# Patient Record
Sex: FEMALE | Race: WHITE | NOT HISPANIC OR LATINO | Employment: OTHER | URBAN - METROPOLITAN AREA
[De-identification: names, ages, dates, MRNs, and addresses within clinical notes are randomized per-mention and may not be internally consistent; named-entity substitution may affect disease eponyms.]

---

## 2017-03-02 ENCOUNTER — TELEPHONE (OUTPATIENT)
Dept: OBSTETRICS AND GYNECOLOGY | Age: 72
End: 2017-03-02

## 2017-03-02 NOTE — TELEPHONE ENCOUNTER
Had pap and hpv done, medicare said that it needed to be resubmitted with a procedure code so they will pay

## 2017-05-09 ENCOUNTER — PROCEDURE VISIT (OUTPATIENT)
Dept: OBSTETRICS AND GYNECOLOGY | Age: 72
End: 2017-05-09

## 2017-05-09 ENCOUNTER — OFFICE VISIT (OUTPATIENT)
Dept: OBSTETRICS AND GYNECOLOGY | Age: 72
End: 2017-05-09

## 2017-05-09 VITALS
BODY MASS INDEX: 20.98 KG/M2 | HEIGHT: 62 IN | WEIGHT: 114 LBS | DIASTOLIC BLOOD PRESSURE: 80 MMHG | SYSTOLIC BLOOD PRESSURE: 130 MMHG

## 2017-05-09 DIAGNOSIS — D25.9 UTERINE LEIOMYOMA, UNSPECIFIED LOCATION: Primary | ICD-10-CM

## 2017-05-09 DIAGNOSIS — L90.0 LICHEN SCLEROSUS ET ATROPHICUS: ICD-10-CM

## 2017-05-09 DIAGNOSIS — D25.2 SUBSEROUS LEIOMYOMA OF UTERUS: Primary | ICD-10-CM

## 2017-05-09 PROCEDURE — 99214 OFFICE O/P EST MOD 30 MIN: CPT | Performed by: OBSTETRICS & GYNECOLOGY

## 2017-05-09 PROCEDURE — 76830 TRANSVAGINAL US NON-OB: CPT | Performed by: OBSTETRICS & GYNECOLOGY

## 2017-11-02 ENCOUNTER — OFFICE VISIT (OUTPATIENT)
Dept: GASTROENTEROLOGY | Facility: CLINIC | Age: 72
End: 2017-11-02

## 2017-11-02 VITALS
DIASTOLIC BLOOD PRESSURE: 82 MMHG | SYSTOLIC BLOOD PRESSURE: 130 MMHG | HEIGHT: 62 IN | BODY MASS INDEX: 20.61 KG/M2 | TEMPERATURE: 97.9 F | WEIGHT: 112 LBS

## 2017-11-02 DIAGNOSIS — K22.70 BARRETT'S ESOPHAGUS WITHOUT DYSPLASIA: ICD-10-CM

## 2017-11-02 DIAGNOSIS — K63.5 POLYP OF COLON, UNSPECIFIED PART OF COLON, UNSPECIFIED TYPE: ICD-10-CM

## 2017-11-02 DIAGNOSIS — R10.13 EPIGASTRIC PAIN: Primary | ICD-10-CM

## 2017-11-02 PROCEDURE — 99213 OFFICE O/P EST LOW 20 MIN: CPT | Performed by: INTERNAL MEDICINE

## 2017-11-02 RX ORDER — OMEPRAZOLE 20 MG/1
20 CAPSULE, DELAYED RELEASE ORAL 2 TIMES DAILY
COMMUNITY
End: 2018-12-06 | Stop reason: ALTCHOICE

## 2017-11-02 NOTE — PROGRESS NOTES
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 It seems to be getting worse but not every day and not all day long. Loss of appetite. She may have lost a few pounds? Not a huge appetite. She is on Pepcid OTC one BID. She isn't sure it helps. She doesn't know what makes her epigastric pain worse or better. No nausea or vomting. No fevers, chills. No constipation, no diarrhea. No rectal bleeding or melena. Rarely NSAID use. Occas ETOH. I reviewed labs done 9/15/17 by Dr. Larose.    Past Medical History:   Diagnosis Date   • Anemia     Diagnosed with iron def. anemia, off and on throughout her life. Taking multivit with iron.   • Anguiano esophagus     Diagnosed by Dr. Contreras about 2008.   • Gastritis    • GERD (gastroesophageal reflux disease)     Since 2878-0287, worse now.   • History of shingles 02/01/2016 Feb 2016, first episode. No hx of the vaccine.   • Hypertension     Since the 90's.   • Iron deficiency    • Migraine     Since childhood, severe vomiting, visual changes, no aura. Not as bad now.Will try Maxalt with next one.   • Postherpetic neuralgia     Numbness and itching involving right side of face. ? Facial nerve.   • Urticaria    • Weight loss     8# loss from July- Dec, while taking care of her brother and then had shingles. Weight stable now. But not gaining.       Past Surgical History:   Procedure Laterality Date   • COLONOSCOPY  08/12/2010    tics, NBIH, polyp   • COLONOSCOPY N/A 7/12/2016    Diverticulosis in the sigmoid colon, Internal hemorrhoids   • DILATATION AND CURETTAGE  1972    DUB in the 70's.   • ENDOSCOPY N/A 7/12/2016    Z-line irregular, 40 cm from the incisors, Ectopic gastric mucosa in the upper third of the esophagus near the upper esophageal sphincter, Erythematous mucosa in the stomach   • TONSILLECTOMY      at 4 yoa.   T&A.   • TUBAL ABDOMINAL LIGATION     • UPPER GASTROINTESTINAL ENDOSCOPY  2015    Surinder Contreras M.D.- [facility]         Current Outpatient Prescriptions:   •  furosemide (LASIX) 20 MG tablet, Take 20 mg by mouth daily., Disp: , Rfl:   •  lisinopril (PRINIVIL,ZESTRIL) 5 MG tablet, Take 5 mg by mouth daily., Disp: , Rfl:   •  Multiple Vitamin (MULTI VITAMIN DAILY PO), Take  by mouth., Disp: , Rfl:   •  omeprazole (priLOSEC) 20 MG capsule, Take 20 mg by mouth 2 (Two) Times a Day., Disp: , Rfl:   •  Potassium (POTASSIMIN PO), Take 10 mEq by mouth., Disp: , Rfl:   •  betamethasone valerate (VALISONE) 0.1 % cream, Apply  topically 2 (two) times a day. Apply a small amount to the labia daily for two weeks then Monday - Wednesday - Friday, Disp: 45 g, Rfl: 3    No Known Allergies    Family History   Problem Relation Age of Onset   • Melanoma Brother 65      PASSED    • Parkinsonism Mother    • Lung cancer Father    • Diabetes type II Sister    • Heart disease Maternal Grandmother    • Diabetes Maternal Grandmother    • No Known Problems Maternal Grandfather                                                after falling off a bridge.   • Cancer Paternal Grandmother    • Cancer Paternal Grandfather    • Melanoma Brother    • Heart disease Brother      Atrial Fib.   • Skin cancer Brother    • Coronary artery disease Brother      with stents   • Hypertension Brother        Social History     Social History   • Marital status: Single     Spouse name: N/A   • Number of children: 0   • Years of education: MASTERS     Occupational History   • Not on file.     Social History Main Topics   • Smoking status: Never Smoker   • Smokeless tobacco: Never Used   • Alcohol use 1.2 oz/week     1 Glasses of wine, 1 Shots of liquor per week      Comment: daily   • Drug use: No   • Sexual activity: Not on file     Other Topics Concern   • Not on file     Social History Narrative       Review of Systems   Gastrointestinal: Positive for  abdominal pain.   All other systems reviewed and are negative.      Vitals:    11/02/17 1054   BP: 130/82   Temp: 97.9 °F (36.6 °C)       Physical Exam   Constitutional: She is oriented to person, place, and time. She appears well-developed and well-nourished. No distress.   HENT:   Head: Normocephalic and atraumatic. Hair is normal.   Right Ear: Hearing, tympanic membrane, external ear and ear canal normal. No drainage. No decreased hearing is noted.   Left Ear: Hearing, tympanic membrane, external ear and ear canal normal. No decreased hearing is noted.   Nose: No nasal deformity.   Mouth/Throat: Oropharynx is clear and moist.   Eyes: Conjunctivae, EOM and lids are normal. Pupils are equal, round, and reactive to light. Right eye exhibits no discharge. Left eye exhibits no discharge.   Neck: Normal range of motion. Neck supple. No JVD present. No tracheal deviation present. No thyromegaly present.   Cardiovascular: Normal rate, regular rhythm, normal heart sounds, intact distal pulses and normal pulses.  Exam reveals no gallop and no friction rub.    No murmur heard.  Pulmonary/Chest: Effort normal and breath sounds normal. No respiratory distress. She has no wheezes. She has no rales. She exhibits no tenderness.   Abdominal: Soft. Bowel sounds are normal. She exhibits no distension and no mass. There is no tenderness. There is no rebound and no guarding. No hernia.   Genitourinary: Rectal exam shows guaiac negative stool.   Genitourinary Comments: Normal anorectal exam.   Musculoskeletal: Normal range of motion. She exhibits no edema, tenderness or deformity.   Lymphadenopathy:     She has no cervical adenopathy.   Neurological: She is alert and oriented to person, place, and time. She has normal reflexes. She displays normal reflexes. No cranial nerve deficit. She exhibits normal muscle tone. Coordination normal.   Skin: Skin is warm and dry. No rash noted. She is not diaphoretic. No erythema.   Psychiatric: She  has a normal mood and affect. Her behavior is normal. Judgment and thought content normal.   Vitals reviewed.      Viry was seen today for follow-up.    Diagnoses and all orders for this visit:    Epigastric pain  -     Amylase  -     Lipase  -     CT Abdomen Pelvis With Contrast; Future    Polyp of colon, unspecified part of colon, unspecified type    Anguiano's esophagus without dysplasia      Assessment:  1) Short segment Anguiano's esophagus  2) h/o colon polyps  3) Epigastric pain    REcommendations:  1) Labs: amylase, lipase  2) CT scan of the abd/pelvis with pancreatic protocol.  3) Omeprazole 40 mg/day.  4) F/U 4 weeks.    Return in about 4 weeks (around 11/30/2017).    Jamil Maciel MD  11/2/2017

## 2017-11-03 ENCOUNTER — TELEPHONE (OUTPATIENT)
Dept: GASTROENTEROLOGY | Facility: CLINIC | Age: 72
End: 2017-11-03

## 2017-11-03 LAB
AMYLASE SERPL-CCNC: 121 U/L (ref 28–100)
LIPASE SERPL-CCNC: 49 U/L (ref 13–60)

## 2017-11-03 NOTE — PROGRESS NOTES
Tell her that her amylase level is mildly elevated at 121 (normal is ).  Her lipase level is normal at 49.  I will be curious what the CAT scan of the abdomen and pelvis shows.  I do not know what the mild elevation of the amylase means.  Again I'll be curious what the CAT scan of the abdomen and pelvis shows.

## 2017-11-03 NOTE — TELEPHONE ENCOUNTER
----- Message from Jamil Maciel MD sent at 11/3/2017 12:12 PM EDT -----  Tell her that her amylase level is mildly elevated at 121 (normal is ).  Her lipase level is normal at 49.  I will be curious what the CAT scan of the abdomen and pelvis shows.  I do not know what the mild elevation of the amylase means.  Again I'll be curious what the CAT scan of the abdomen and pelvis shows.

## 2017-11-03 NOTE — TELEPHONE ENCOUNTER
Call to pt. Advise per Dr Maciel that amylase level is mildly elevated at 121 (normal is ).  Lipase level is normal at 49.  Manjinder Maciel will be curious what the CT abd/pelvis shows.  He does not know what the mild elevation of the amylase means - will be curious what the CT shows.    Pt verb understanding.  States CT is scheduled for 11/8.

## 2017-11-08 ENCOUNTER — HOSPITAL ENCOUNTER (OUTPATIENT)
Dept: CT IMAGING | Facility: HOSPITAL | Age: 72
Discharge: HOME | End: 2017-11-08
Attending: INTERNAL MEDICINE | Admitting: INTERNAL MEDICINE

## 2017-11-08 DIAGNOSIS — R10.13 EPIGASTRIC PAIN: ICD-10-CM

## 2017-11-08 LAB — CREAT BLDA-MCNC: 0.7 MG/DL (ref 0.6–1.3)

## 2017-11-08 PROCEDURE — 82565 ASSAY OF CREATININE: CPT

## 2017-11-08 PROCEDURE — 0 IOPAMIDOL 61 % SOLUTION: Performed by: INTERNAL MEDICINE

## 2017-11-08 PROCEDURE — 74177 CT ABD & PELVIS W/CONTRAST: CPT

## 2017-11-08 RX ADMIN — IOPAMIDOL 85 ML: 612 INJECTION, SOLUTION INTRAVENOUS at 08:15

## 2017-11-14 ENCOUNTER — OFFICE VISIT (OUTPATIENT)
Dept: OBSTETRICS AND GYNECOLOGY | Age: 72
End: 2017-11-14

## 2017-11-14 ENCOUNTER — PROCEDURE VISIT (OUTPATIENT)
Dept: OBSTETRICS AND GYNECOLOGY | Age: 72
End: 2017-11-14

## 2017-11-14 VITALS
WEIGHT: 114 LBS | HEIGHT: 62 IN | BODY MASS INDEX: 20.98 KG/M2 | SYSTOLIC BLOOD PRESSURE: 118 MMHG | DIASTOLIC BLOOD PRESSURE: 80 MMHG

## 2017-11-14 DIAGNOSIS — Z78.0 MENOPAUSE: ICD-10-CM

## 2017-11-14 DIAGNOSIS — D25.2 INTRAMURAL AND SUBSEROUS LEIOMYOMA OF UTERUS: ICD-10-CM

## 2017-11-14 DIAGNOSIS — D25.1 INTRAMURAL AND SUBSEROUS LEIOMYOMA OF UTERUS: ICD-10-CM

## 2017-11-14 DIAGNOSIS — Z01.419 WELL WOMAN EXAM WITH ROUTINE GYNECOLOGICAL EXAM: Primary | ICD-10-CM

## 2017-11-14 DIAGNOSIS — D25.9 UTERINE LEIOMYOMA, UNSPECIFIED LOCATION: Primary | ICD-10-CM

## 2017-11-14 DIAGNOSIS — L90.0 LICHEN SCLEROSUS ET ATROPHICUS: ICD-10-CM

## 2017-11-14 DIAGNOSIS — N95.2 VAGINAL ATROPHY: ICD-10-CM

## 2017-11-14 LAB
BILIRUB BLD-MCNC: NEGATIVE MG/DL
GLUCOSE UR STRIP-MCNC: NEGATIVE MG/DL
KETONES UR QL: NEGATIVE
LEUKOCYTE EST, POC: NEGATIVE
NITRITE UR-MCNC: NEGATIVE MG/ML
PH UR: 7 [PH] (ref 5–8)
PROT UR STRIP-MCNC: NEGATIVE MG/DL
RBC # UR STRIP: ABNORMAL /UL
SP GR UR: 1.02 (ref 1–1.03)
UROBILINOGEN UR QL: NORMAL

## 2017-11-14 PROCEDURE — 76856 US EXAM PELVIC COMPLETE: CPT | Performed by: OBSTETRICS & GYNECOLOGY

## 2017-11-14 PROCEDURE — G0101 CA SCREEN;PELVIC/BREAST EXAM: HCPCS | Performed by: OBSTETRICS & GYNECOLOGY

## 2017-11-14 PROCEDURE — 76830 TRANSVAGINAL US NON-OB: CPT | Performed by: OBSTETRICS & GYNECOLOGY
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
## 2017-11-22 ENCOUNTER — PROCEDURE VISIT (OUTPATIENT)
Dept: OBSTETRICS AND GYNECOLOGY | Age: 72
End: 2017-11-22

## 2017-11-22 DIAGNOSIS — Z78.0 POST-MENOPAUSAL: Primary | ICD-10-CM

## 2017-11-22 PROCEDURE — 77080 DXA BONE DENSITY AXIAL: CPT | Performed by: OBSTETRICS & GYNECOLOGY

## 2017-11-29 ENCOUNTER — TELEPHONE (OUTPATIENT)
Dept: MAMMOGRAPHY | Age: 72
End: 2017-11-29

## 2017-11-29 NOTE — TELEPHONE ENCOUNTER
Informed pt her mammogram was abnormal .  Recommends RT spot and possible US of right breast.  [facility] schedule one will call her to set up appt.  Order in [facility].

## 2017-12-01 ENCOUNTER — HOSPITAL ENCOUNTER (OUTPATIENT)
Dept: ULTRASOUND IMAGING | Facility: HOSPITAL | Age: 72
Discharge: HOME | End: 2017-12-01
Attending: OBSTETRICS & GYNECOLOGY

## 2017-12-01 ENCOUNTER — HOSPITAL ENCOUNTER (OUTPATIENT)
Dept: MAMMOGRAPHY | Facility: HOSPITAL | Age: 72
Discharge: HOME | End: 2017-12-01
Attending: OBSTETRICS & GYNECOLOGY | Admitting: OBSTETRICS & GYNECOLOGY

## 2017-12-01 DIAGNOSIS — N64.89 BREAST ASYMMETRY: ICD-10-CM

## 2017-12-01 PROCEDURE — 76642 ULTRASOUND BREAST LIMITED: CPT

## 2017-12-01 PROCEDURE — G0206 DX MAMMO INCL CAD UNI: HCPCS

## 2017-12-14 ENCOUNTER — OFFICE VISIT (OUTPATIENT)
Dept: GASTROENTEROLOGY | Facility: CLINIC | Age: 72
End: 2017-12-14

## 2017-12-14 VITALS
SYSTOLIC BLOOD PRESSURE: 124 MMHG | WEIGHT: 114.8 LBS | HEIGHT: 62 IN | BODY MASS INDEX: 21.12 KG/M2 | DIASTOLIC BLOOD PRESSURE: 80 MMHG | TEMPERATURE: 98.4 F

## 2017-12-14 DIAGNOSIS — K22.70 BARRETT'S ESOPHAGUS WITHOUT DYSPLASIA: ICD-10-CM

## 2017-12-14 DIAGNOSIS — K63.5 POLYP OF COLON, UNSPECIFIED PART OF COLON, UNSPECIFIED TYPE: ICD-10-CM

## 2017-12-14 DIAGNOSIS — R11.0 NAUSEA: ICD-10-CM

## 2017-12-14 DIAGNOSIS — R10.13 EPIGASTRIC PAIN: Primary | ICD-10-CM

## 2017-12-14 PROCEDURE — 99213 OFFICE O/P EST LOW 20 MIN: CPT | Performed by: INTERNAL MEDICINE
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
## 2018-02-09 ENCOUNTER — HOSPITAL ENCOUNTER (OUTPATIENT)
Dept: ULTRASOUND IMAGING | Facility: HOSPITAL | Age: 73
Discharge: HOME | End: 2018-02-09
Attending: INTERNAL MEDICINE | Admitting: INTERNAL MEDICINE

## 2018-02-09 DIAGNOSIS — R11.0 NAUSEA: ICD-10-CM

## 2018-02-09 DIAGNOSIS — R10.13 EPIGASTRIC PAIN: ICD-10-CM

## 2018-02-09 PROCEDURE — 76705 ECHO EXAM OF ABDOMEN: CPT

## 2018-02-15 ENCOUNTER — OFFICE VISIT (OUTPATIENT)
Dept: GASTROENTEROLOGY | Facility: CLINIC | Age: 73
End: 2018-02-15

## 2018-02-15 VITALS
SYSTOLIC BLOOD PRESSURE: 122 MMHG | DIASTOLIC BLOOD PRESSURE: 84 MMHG | BODY MASS INDEX: 21.05 KG/M2 | WEIGHT: 114.4 LBS | TEMPERATURE: 97.8 F | HEIGHT: 62 IN

## 2018-02-15 DIAGNOSIS — R10.13 EPIGASTRIC PAIN: Primary | ICD-10-CM

## 2018-02-15 DIAGNOSIS — K63.5 POLYP OF COLON, UNSPECIFIED PART OF COLON, UNSPECIFIED TYPE: ICD-10-CM

## 2018-02-15 DIAGNOSIS — K22.70 BARRETT'S ESOPHAGUS WITHOUT DYSPLASIA: ICD-10-CM

## 2018-02-15 PROCEDURE — 99213 OFFICE O/P EST LOW 20 MIN: CPT | Performed by: INTERNAL MEDICINE
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
## 2018-05-29 ENCOUNTER — PROCEDURE VISIT (OUTPATIENT)
Dept: OBSTETRICS AND GYNECOLOGY | Age: 73
End: 2018-05-29

## 2018-05-29 ENCOUNTER — OFFICE VISIT (OUTPATIENT)
Dept: OBSTETRICS AND GYNECOLOGY | Age: 73
End: 2018-05-29

## 2018-05-29 VITALS
HEIGHT: 62 IN | DIASTOLIC BLOOD PRESSURE: 62 MMHG | SYSTOLIC BLOOD PRESSURE: 110 MMHG | BODY MASS INDEX: 20.8 KG/M2 | WEIGHT: 113 LBS

## 2018-05-29 DIAGNOSIS — D25.2 INTRAMURAL AND SUBSEROUS LEIOMYOMA OF UTERUS: Primary | ICD-10-CM

## 2018-05-29 DIAGNOSIS — D25.9 UTERINE LEIOMYOMA, UNSPECIFIED LOCATION: Primary | ICD-10-CM

## 2018-05-29 DIAGNOSIS — D25.1 INTRAMURAL AND SUBSEROUS LEIOMYOMA OF UTERUS: Primary | ICD-10-CM

## 2018-05-29 PROCEDURE — 99212 OFFICE O/P EST SF 10 MIN: CPT | Performed by: OBSTETRICS & GYNECOLOGY

## 2018-05-29 PROCEDURE — 76830 TRANSVAGINAL US NON-OB: CPT | Performed by: OBSTETRICS & GYNECOLOGY

## 2018-05-29 NOTE — PROGRESS NOTES
"GYN FOLLOW UP/PELVIC ULTRASOUND            2018    Subjective   Viry Randall is a 72 y.o.  Female        Chief complaint:  Follow-up (VIRY IS HERE FOR FIBROIDS. ULTRASOUND WITH VIRY TODAY. )    History of Present Illness: Follow-up for uterine fibroids.  Asymptomatic.  No bleeding or discharge.  No pain.     Review of Systems: She recently had recurrent some hot flashes and night sweats for a short time.  Has not been on any hormone therapy, and no over-the-counter \"hormone therapy\".     She has a history of migraine headaches but has not had any for a while, but then did have a significant headache recently just on the top of her head, that was not like her migraine, and has not been a recurrent headache.  May be only a couple of times in the last few years. No neurological changes.  No sinus problems.  We discussed the possibility of tension headaches, but really isn't having any different stress.    The following portions of the patient's history were reviewed / updated as appropriate:   allergies, current medications,  past medical history, past surgical history, past family history, past social history, and problem list.    Objective     /62   Ht 157.5 cm (62\")   Wt 51.3 kg (113 lb)   Breastfeeding? No   BMI 20.67 kg/m²     PHYSICAL EXAM     Gen'l : Well-developed, thin  female, well oriented ×3, in no distress.       Abd   : No exam.       Pelvic: No exam.    PELVIC ULTRASOUND       Uterus: Retroverted.  Measures 6.3 x 4.8 x 4.1 cm, with a volume of 65.82 cc's.  Endometrial stripe is very indistinct and could not be clearly delineated.  There are 4 fibroids: 3 within the body of the uterus measuring 2.2 x 2.3 cm, 1.9 x 2.5 cm, 1.4 x 1.9 cm.  There is also a subserosal or pedunculated fibroid, off the posterior surface of the uterus down close to the cervix, measuring 2.3 x 1.37     Left ovary: Measures 1.8 x 1.4 x 1.0 cm, with a volume of 1.22 cc.'s     Right ovary: " Measures 1.5 x 1.2 x 0.9 cm, with a volume of 0.82 cc's.     Cul-de-sac: Contains no free fluid or masses.          ASSESSMENT: Stable uterine size and stable fibroids.  I think the pedunculated fibroid was present before, just was not measured.  Overall, the size the uterus is a little increased, because the uterine length was measured differently this time.  I think it is stable, if anything the fibroids look smaller.       RECOMMENDATION: Repeat pelvic ultrasound with annual exam in 6 months.      Assessment/Plan   Viry was seen today for follow-up.    Diagnoses and all orders for this visit:    Intramural and subserous leiomyoma of uterus      COMMENTS: Fibroids appears stable, slightly decreased compared to ultrasound 6 months ago.  We'll repeat ultrasound at annual exam in 6 months and then only prn after that.  Viry is not on HRT.    Marco A Hammond M.D.         5/29/2018

## 2018-07-10 ENCOUNTER — TELEPHONE (OUTPATIENT)
Dept: GASTROENTEROLOGY | Facility: CLINIC | Age: 73
End: 2018-07-10

## 2018-07-10 NOTE — TELEPHONE ENCOUNTER
Spoke with patient and told her that her last EGD was done in 7/2016 and showed short segment Anguiano's Esophagus without dysplasia(which is good).  Dr Maciel said that as long as patient is feeling okay and isn't having worsening heartburn or dysphagia, he does not think she needs a repeat EGD for 3 - 5 years(7/2019-7/2021).  Patient voiced understanding.

## 2018-07-10 NOTE — TELEPHONE ENCOUNTER
----- Message from Jamil Maciel MD sent at 7/9/2018  6:02 PM EDT -----  Regarding: RE: Next EGD  She last had an EGD by me in 7/16 and it showed short segment Anguiano's esophagus without dysplasia, which is good. As long as she is feeling OK and isn't having worsening heartburn or dysphagia then I don't think she needs a repeat EGD till 3-5 yrs after her 7/16 EGD was done. [email or web address]  ----- Message -----  From: Aleida Mcneal RN  Sent: 7/9/2018  10:23 AM  To: Jamil Maciel MD  Subject: Next EGD                                         Patient left a voice  message asking when she is due for her next EGD.  She said that she got a card from her old GI MD stating that she is due but wanted your opinion. Please advise.  Thanks!

## 2018-12-06 ENCOUNTER — APPOINTMENT (OUTPATIENT)
Dept: WOMENS IMAGING | Facility: HOSPITAL | Age: 73
End: 2018-12-06

## 2018-12-06 ENCOUNTER — PROCEDURE VISIT (OUTPATIENT)
Dept: OBSTETRICS AND GYNECOLOGY | Age: 73
End: 2018-12-06

## 2018-12-06 ENCOUNTER — TELEPHONE (OUTPATIENT)
Dept: OBSTETRICS AND GYNECOLOGY | Age: 73
End: 2018-12-06

## 2018-12-06 ENCOUNTER — OFFICE VISIT (OUTPATIENT)
Dept: OBSTETRICS AND GYNECOLOGY | Age: 73
End: 2018-12-06

## 2018-12-06 VITALS
SYSTOLIC BLOOD PRESSURE: 138 MMHG | HEIGHT: 62 IN | WEIGHT: 112 LBS | BODY MASS INDEX: 20.61 KG/M2 | DIASTOLIC BLOOD PRESSURE: 80 MMHG

## 2018-12-06 DIAGNOSIS — N95.2 VAGINAL ATROPHY: ICD-10-CM

## 2018-12-06 DIAGNOSIS — Z12.31 VISIT FOR SCREENING MAMMOGRAM: Primary | ICD-10-CM

## 2018-12-06 DIAGNOSIS — M54.6 ACUTE MIDLINE THORACIC BACK PAIN: ICD-10-CM

## 2018-12-06 DIAGNOSIS — M85.80 OSTEOPENIA AFTER MENOPAUSE: ICD-10-CM

## 2018-12-06 DIAGNOSIS — Z78.0 OSTEOPENIA AFTER MENOPAUSE: ICD-10-CM

## 2018-12-06 DIAGNOSIS — Z01.419 WELL WOMAN EXAM WITH ROUTINE GYNECOLOGICAL EXAM: Primary | ICD-10-CM

## 2018-12-06 DIAGNOSIS — Z13.89 SCREENING FOR HEMATURIA OR PROTEINURIA: ICD-10-CM

## 2018-12-06 DIAGNOSIS — L90.0 LICHEN SCLEROSUS ET ATROPHICUS: ICD-10-CM

## 2018-12-06 DIAGNOSIS — Z78.0 MENOPAUSE: ICD-10-CM

## 2018-12-06 LAB
BILIRUB BLD-MCNC: NEGATIVE MG/DL
CLARITY, POC: CLEAR
COLOR UR: YELLOW
GLUCOSE UR STRIP-MCNC: NEGATIVE MG/DL
KETONES UR QL: NEGATIVE
LEUKOCYTE EST, POC: NEGATIVE
NITRITE UR-MCNC: NEGATIVE MG/ML
PH UR: 5.5 [PH] (ref 5–8)
PROT UR STRIP-MCNC: NEGATIVE MG/DL
RBC # UR STRIP: ABNORMAL /UL
SP GR UR: 1.03 (ref 1–1.03)
UROBILINOGEN UR QL: NORMAL

## 2018-12-06 PROCEDURE — G0101 CA SCREEN;PELVIC/BREAST EXAM: HCPCS | Performed by: OBSTETRICS & GYNECOLOGY

## 2018-12-06 PROCEDURE — 77067 SCR MAMMO BI INCL CAD: CPT | Performed by: RADIOLOGY

## 2018-12-06 PROCEDURE — 77067 SCR MAMMO BI INCL CAD: CPT | Performed by: OBSTETRICS & GYNECOLOGY

## 2018-12-06 PROCEDURE — 81002 URINALYSIS NONAUTO W/O SCOPE: CPT | Performed by: OBSTETRICS & GYNECOLOGY

## 2018-12-06 PROCEDURE — 99213 OFFICE O/P EST LOW 20 MIN: CPT | Performed by: OBSTETRICS & GYNECOLOGY

## 2018-12-06 RX ORDER — POTASSIUM CHLORIDE 750 MG/1
TABLET, FILM COATED, EXTENDED RELEASE ORAL
COMMUNITY
Start: 2018-11-06 | End: 2020-12-07

## 2018-12-06 RX ORDER — FLUOXETINE HYDROCHLORIDE 40 MG/1
CAPSULE ORAL
COMMUNITY
Start: 2018-10-10 | End: 2021-06-16

## 2018-12-06 RX ORDER — CHOLECALCIFEROL (VITAMIN D3) 50 MCG
TABLET ORAL
COMMUNITY

## 2018-12-06 RX ORDER — IBUPROFEN 800 MG/1
800 TABLET ORAL ONCE AS NEEDED
COMMUNITY
Start: 2018-09-04 | End: 2019-11-25

## 2018-12-06 RX ORDER — LANSOPRAZOLE 30 MG/1
CAPSULE, DELAYED RELEASE ORAL
COMMUNITY
Start: 2018-11-10 | End: 2019-06-14
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
## 2018-12-08 PROBLEM — Z78.0 OSTEOPENIA AFTER MENOPAUSE: Status: ACTIVE | Noted: 2018-01-01

## 2018-12-08 PROBLEM — M85.80 OSTEOPENIA AFTER MENOPAUSE: Status: ACTIVE | Noted: 2018-01-01

## 2018-12-11 NOTE — PROGRESS NOTES
Normal mammogram. Breasts with scattered areas of fibroglandular density.  Stable oval solid mass measuring 20 mm seen in the right breast, proven by biopsy to be benign breast disease.  No significant change from the prior exam.  Nothing to suggest malignancy in either breast..  BI-RADS 2.  Repeat in 1 year.

## 2018-12-13 DIAGNOSIS — M54.6 CHRONIC MIDLINE THORACIC BACK PAIN: ICD-10-CM

## 2018-12-13 DIAGNOSIS — Z78.0 OSTEOPENIA AFTER MENOPAUSE: Primary | ICD-10-CM

## 2018-12-13 DIAGNOSIS — M85.80 OSTEOPENIA AFTER MENOPAUSE: Primary | ICD-10-CM

## 2018-12-13 DIAGNOSIS — G89.29 CHRONIC MIDLINE THORACIC BACK PAIN: ICD-10-CM

## 2018-12-13 RX ORDER — RALOXIFENE HYDROCHLORIDE 60 MG/1
60 TABLET, FILM COATED ORAL DAILY
Qty: 30 TABLET | Refills: 12 | Status: SHIPPED | OUTPATIENT
Start: 2018-12-13 | End: 2019-12-16 | Stop reason: SDUPTHER

## 2019-02-01 LAB
ALBUMIN SERPL-MCNC: NORMAL G/DL
ALP SERPL-CCNC: NORMAL U/L
ALT SERPL-CCNC: NORMAL U/L
AST SERPL-CCNC: NORMAL U/L
BASOPHILS # BLD AUTO: (no result) 10*3/UL
BILIRUB SERPL-MCNC: NORMAL MG/DL
BUN SERPL-MCNC: NORMAL MG/DL
CALCIUM SERPL-MCNC: NORMAL MG/DL
CHLORIDE SERPL-SCNC: NORMAL MMOL/L
CO2 SERPL-SCNC: NORMAL MMOL/L
CREAT SERPL-MCNC: NORMAL MG/DL
EOSINOPHIL # BLD AUTO: (no result) 10*3/UL
EOSINOPHIL NFR BLD AUTO: (no result) %
GLUCOSE SERPL-MCNC: NORMAL MG/DL
HCT VFR BLD AUTO: (no result) %
HGB BLD-MCNC: (no result) G/DL
LYMPHOCYTES # BLD AUTO: (no result) 10*3/UL
LYMPHOCYTES NFR BLD AUTO: (no result) %
MONOCYTES NFR BLD AUTO: (no result) %
NEUTROPHILS NFR BLD AUTO: (no result) %
PLATELET # BLD AUTO: (no result) 10*3/UL
POTASSIUM SERPL-SCNC: NORMAL MMOL/L
PROT SERPL-MCNC: NORMAL G/DL
RBC # BLD AUTO: (no result) 10*6/UL
SODIUM SERPL-SCNC: NORMAL MMOL/L
WBC # BLD AUTO: (no result) X10E3/UL

## 2019-02-22 ENCOUNTER — HOSPITAL ENCOUNTER (OUTPATIENT)
Dept: GENERAL RADIOLOGY | Facility: HOSPITAL | Age: 74
Discharge: HOME | End: 2019-02-22
Admitting: OBSTETRICS & GYNECOLOGY

## 2019-02-22 DIAGNOSIS — M54.6 CHRONIC MIDLINE THORACIC BACK PAIN: ICD-10-CM

## 2019-02-22 DIAGNOSIS — G89.29 CHRONIC MIDLINE THORACIC BACK PAIN: ICD-10-CM

## 2019-02-22 PROCEDURE — 72072 X-RAY EXAM THORAC SPINE 3VWS: CPT

## 2019-06-14 ENCOUNTER — OFFICE VISIT (OUTPATIENT)
Dept: GASTROENTEROLOGY | Facility: CLINIC | Age: 74
End: 2019-06-14

## 2019-06-14 VITALS
TEMPERATURE: 98.2 F | SYSTOLIC BLOOD PRESSURE: 130 MMHG | DIASTOLIC BLOOD PRESSURE: 74 MMHG | HEIGHT: 62 IN | BODY MASS INDEX: 21.05 KG/M2 | WEIGHT: 114.4 LBS

## 2019-06-14 DIAGNOSIS — K21.9 GASTROESOPHAGEAL REFLUX DISEASE, ESOPHAGITIS PRESENCE NOT SPECIFIED: ICD-10-CM

## 2019-06-14 DIAGNOSIS — K22.70 BARRETT'S ESOPHAGUS WITHOUT DYSPLASIA: ICD-10-CM

## 2019-06-14 DIAGNOSIS — R10.13 EPIGASTRIC PAIN: Primary | ICD-10-CM

## 2019-06-14 DIAGNOSIS — R11.10 REGURGITATION OF FOOD: ICD-10-CM

## 2019-06-14 PROCEDURE — 99214 OFFICE O/P EST MOD 30 MIN: CPT | Performed by: NURSE PRACTITIONER

## 2019-06-14 RX ORDER — PANTOPRAZOLE SODIUM 40 MG/1
40 TABLET, DELAYED RELEASE ORAL DAILY
Qty: 90 TABLET | Refills: 3 | Status: SHIPPED | OUTPATIENT
Start: 2019-06-14 | End: 2020-06-17
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
## 2019-06-18 ENCOUNTER — TELEPHONE (OUTPATIENT)
Dept: OBSTETRICS AND GYNECOLOGY | Age: 74
End: 2019-06-18

## 2019-06-25 ENCOUNTER — TELEPHONE (OUTPATIENT)
Dept: GASTROENTEROLOGY | Facility: CLINIC | Age: 74
End: 2019-06-25

## 2019-06-25 NOTE — TELEPHONE ENCOUNTER
Yes I think Protonix 40 mg daily is right. I don't see where it was changed to BID. Is it working well for her?

## 2019-06-25 NOTE — TELEPHONE ENCOUNTER
Called pt and pt is asking if she is to be taking her pantoprazole 40mg once a day or twice a day.  Advised that per her office note , she is to take this once a day.   Also pt reports that the MA who roomed her asked her if she took evista and pt stated no, but has since later learned she was suppose to be taking this due to osteoporosis.  Pt states she will begin taking this after her scopes.  Update sent to Idania METZ.

## 2019-06-25 NOTE — TELEPHONE ENCOUNTER
----- Message from Holly Leon sent at 6/25/2019  9:31 AM EDT -----  Regarding: med   Contact: [phone]  QUESTIONS ABOUT PANTOPRAZOLE

## 2019-06-25 NOTE — TELEPHONE ENCOUNTER
At previous call , pt reports that the protonix once a day is working fine.  Update sent to Idania METZ.

## 2019-07-18 ENCOUNTER — ANESTHESIA EVENT (OUTPATIENT)
Dept: GASTROENTEROLOGY | Facility: HOSPITAL | Age: 74
End: 2019-07-18

## 2019-07-18 ENCOUNTER — HOSPITAL ENCOUNTER (OUTPATIENT)
Facility: HOSPITAL | Age: 74
Setting detail: HOSPITAL OUTPATIENT SURGERY
Discharge: HOME | End: 2019-07-18
Attending: INTERNAL MEDICINE | Admitting: INTERNAL MEDICINE

## 2019-07-18 ENCOUNTER — ANESTHESIA (OUTPATIENT)
Dept: GASTROENTEROLOGY | Facility: HOSPITAL | Age: 74
End: 2019-07-18

## 2019-07-18 VITALS
SYSTOLIC BLOOD PRESSURE: 146 MMHG | DIASTOLIC BLOOD PRESSURE: 95 MMHG | HEART RATE: 61 BPM | OXYGEN SATURATION: 98 % | TEMPERATURE: 98.3 F | RESPIRATION RATE: 21 BRPM | WEIGHT: 113.3 LBS | BODY MASS INDEX: 20.72 KG/M2

## 2019-07-18 DIAGNOSIS — R11.10 REGURGITATION OF FOOD: ICD-10-CM

## 2019-07-18 DIAGNOSIS — K21.9 GASTROESOPHAGEAL REFLUX DISEASE, ESOPHAGITIS PRESENCE NOT SPECIFIED: ICD-10-CM

## 2019-07-18 DIAGNOSIS — R10.13 EPIGASTRIC PAIN: ICD-10-CM

## 2019-07-18 DIAGNOSIS — K22.70 BARRETT'S ESOPHAGUS WITHOUT DYSPLASIA: ICD-10-CM

## 2019-07-18 PROCEDURE — 88305 TISSUE EXAM BY PATHOLOGIST: CPT | Performed by: INTERNAL MEDICINE

## 2019-07-18 PROCEDURE — 25010000002 PROPOFOL 1000 MG/ML EMULSION: Performed by: ANESTHESIOLOGY

## 2019-07-18 PROCEDURE — 25010000002 PROPOFOL 10 MG/ML EMULSION: Performed by: ANESTHESIOLOGY

## 2019-07-18 PROCEDURE — 43239 EGD BIOPSY SINGLE/MULTIPLE: CPT | Performed by: INTERNAL MEDICINE

## 2019-07-18 PROCEDURE — 87081 CULTURE SCREEN ONLY: CPT | Performed by: INTERNAL MEDICINE

## 2019-07-18 RX ORDER — PROPOFOL 10 MG/ML
VIAL (ML) INTRAVENOUS AS NEEDED
Status: DISCONTINUED | OUTPATIENT
Start: 2019-07-18 | End: 2019-07-18 | Stop reason: SURG

## 2019-07-18 RX ORDER — LIDOCAINE HYDROCHLORIDE 20 MG/ML
INJECTION, SOLUTION INFILTRATION; PERINEURAL AS NEEDED
Status: DISCONTINUED | OUTPATIENT
Start: 2019-07-18 | End: 2019-07-18 | Stop reason: SURG

## 2019-07-18 RX ORDER — SODIUM CHLORIDE, SODIUM LACTATE, POTASSIUM CHLORIDE, CALCIUM CHLORIDE 600; 310; 30; 20 MG/100ML; MG/100ML; MG/100ML; MG/100ML
1000 INJECTION, SOLUTION INTRAVENOUS CONTINUOUS
Status: DISCONTINUED | OUTPATIENT
Start: 2019-07-18 | End: 2019-07-18 | Stop reason: HOSPADM

## 2019-07-18 RX ADMIN — SODIUM CHLORIDE, POTASSIUM CHLORIDE, SODIUM LACTATE AND CALCIUM CHLORIDE 1000 ML: 600; 310; 30; 20 INJECTION, SOLUTION INTRAVENOUS at 13:31

## 2019-07-18 RX ADMIN — PROPOFOL 110 MG: 10 INJECTION, EMULSION INTRAVENOUS at 15:40

## 2019-07-18 RX ADMIN — LIDOCAINE HYDROCHLORIDE 60 MG: 20 INJECTION, SOLUTION INFILTRATION; PERINEURAL at 15:40

## 2019-07-18 RX ADMIN — PROPOFOL 200 MCG/KG/MIN: 10 INJECTION, EMULSION INTRAVENOUS at 15:40

## 2019-07-18 NOTE — ANESTHESIA POSTPROCEDURE EVALUATION
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      Regurgitation of food      (Epigastric pain [R10.13])      (Gastroesophageal reflux disease, esophagitis presence not specified [K21.9])      (Anguiano's esophagus without dysplasia [K22.70])      (Regurgitation of food [R11.10])    Surgeon:  Jamil Maciel MD Provider:  Marilynn Hope MD    Anesthesia Type:  MAC ASA Status:  2          Anesthesia Type: MAC  Last vitals  BP   146/95 (07/18/19 1614)   Temp   36.8 °C (98.3 °F) (07/18/19 1551)   Pulse   61 (07/18/19 1614)   Resp   21 (07/18/19 1322)     SpO2   98 % (07/18/19 1614)     Post Anesthesia Care and Evaluation    Patient location during evaluation: bedside  Patient participation: complete - patient participated  Level of consciousness: awake and alert  Pain management: adequate  Airway patency: patent  Anesthetic complications: No anesthetic complications  PONV Status: none  Cardiovascular status: acceptable  Respiratory status: acceptable  Hydration status: acceptable    Comments: /95 (BP Location: Left arm, Patient Position: Lying)   Pulse 61   Temp 36.8 °C (98.3 °F) (Oral)   Resp 21   Wt 51.4 kg (113 lb 4.8 oz)   SpO2 98%   BMI 20.72 kg/m²
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
## 2019-07-18 NOTE — ANESTHESIA PREPROCEDURE EVALUATION
Anesthesia Evaluation     Patient summary reviewed   NPO Solid Status: > 8 hours  NPO Liquid Status: > 8 hours           Airway   Mallampati: II  TM distance: >3 FB  Dental      Pulmonary    Cardiovascular     Rhythm: regular  Rate: normal    (+) hypertension,       Neuro/Psych  GI/Hepatic/Renal/Endo    (+)  GERD,      Musculoskeletal     Abdominal    Substance History      OB/GYN          Other                        Anesthesia Plan    ASA 2     MAC   total IV anesthesia  Anesthetic plan, all risks, benefits, and alternatives have been provided, discussed and informed consent has been obtained with: patient.

## 2019-07-19 LAB — UREASE TISS QL: NEGATIVE

## 2019-07-22 LAB
CYTO UR: NORMAL
LAB AP CASE REPORT: NORMAL
PATH REPORT.FINAL DX SPEC: NORMAL
PATH REPORT.GROSS SPEC: NORMAL

## 2019-08-02 ENCOUNTER — TELEPHONE (OUTPATIENT)
Dept: GASTROENTEROLOGY | Facility: CLINIC | Age: 74
End: 2019-08-02

## 2019-08-02 NOTE — TELEPHONE ENCOUNTER
Call to pt.  Advise per Dr Maciel that path from the recent EGD did show evidence of short segment Anguiano's esophagus,without dysplasia, similar to last time.  If still having upper abd discomfort, f/u in office.    Verb understanding. States already has appt with Dr Maciel for 8/21 for upper abd discomfort.    Message to Dr Maciel.

## 2019-08-02 NOTE — TELEPHONE ENCOUNTER
----- Message from Carli Bhatia sent at 8/2/2019  1:29 PM EDT -----  Regarding: Test results  Contact: [phone]  Pt is calling in regarding test results

## 2019-08-05 ENCOUNTER — TELEPHONE (OUTPATIENT)
Dept: GASTROENTEROLOGY | Facility: CLINIC | Age: 74
End: 2019-08-05

## 2019-08-05 DIAGNOSIS — R10.13 EPIGASTRIC PAIN: Primary | ICD-10-CM

## 2019-08-05 NOTE — TELEPHONE ENCOUNTER
----- Message from Holly Leon sent at 8/5/2019  9:08 AM EDT -----  Regarding: CT   Contact: [phone]  PT CALLING FOR CT ORDER

## 2019-08-05 NOTE — TELEPHONE ENCOUNTER
Call from pt.  [facility] has appt with Dr Maciel 8/21.  On day of scope, understood that Dr Maciel would recommend CT with pancreatic protocol.      States had bad episode yesterday - SOA, abd pain, weakness.  Would like to have CT done prior to appt with Dr Maciel if indicated.    Message to Dr Maciel.

## 2019-08-05 NOTE — TELEPHONE ENCOUNTER
I don't see recent labs done on her. Let's get some labs done before a CT abd/pelvis with pancreatic protocol. Have her come by the office for the following labs: CBC, CMP, amylase, lipase, UA (abdominal pain). Please order this and I will cosign. Have her call a few days after the labs are drawn. If the labs are unrevealing this we could order a CT abd/pelvis with pancreatic protocol. [email or web address]

## 2019-08-05 NOTE — TELEPHONE ENCOUNTER
Called pt and left vm for pt to call back.     Message sent to Dr Maciel regarding approval for ct scan.  Per egd report on 07/18 - consider ct scan for epigastric pain.

## 2019-08-06 NOTE — TELEPHONE ENCOUNTER
Call to pt.  Advise per Dr Maciel that no recent labs done. Will obtain labs - call a few days after drawn.  If labs are unrevealing, then could order at CT.      Verb understanding.  Lab appt scheduled for 8/8 @ 8am.  Order placed for cbc, cmp, amylase, lipase, ua - message to Dr Maciel.

## 2019-08-08 LAB
ALBUMIN SERPL-MCNC: 4.2 G/DL (ref 3.5–5.2)
ALBUMIN/GLOB SERPL: 2.1 G/DL
ALP SERPL-CCNC: 72 U/L (ref 39–117)
ALT SERPL-CCNC: 9 U/L (ref 1–33)
AMYLASE SERPL-CCNC: 91 U/L (ref 28–100)
AST SERPL-CCNC: 15 U/L (ref 1–32)
BASOPHILS # BLD AUTO: 0.03 10*3/MM3 (ref 0–0.2)
BASOPHILS NFR BLD AUTO: 0.7 % (ref 0–1.5)
BILIRUB SERPL-MCNC: 0.5 MG/DL (ref 0.2–1.2)
BUN SERPL-MCNC: 15 MG/DL (ref 8–23)
BUN/CREAT SERPL: 22.1 (ref 7–25)
CALCIUM SERPL-MCNC: 9.1 MG/DL (ref 8.6–10.5)
CHLORIDE SERPL-SCNC: 101 MMOL/L (ref 98–107)
CO2 SERPL-SCNC: 25.7 MMOL/L (ref 22–29)
CREAT SERPL-MCNC: 0.68 MG/DL (ref 0.57–1)
EOSINOPHIL # BLD AUTO: 0.15 10*3/MM3 (ref 0–0.4)
EOSINOPHIL NFR BLD AUTO: 3.4 % (ref 0.3–6.2)
ERYTHROCYTE [DISTWIDTH] IN BLOOD BY AUTOMATED COUNT: 13.2 % (ref 12.3–15.4)
GLOBULIN SER CALC-MCNC: 2 GM/DL
GLUCOSE SERPL-MCNC: 96 MG/DL (ref 65–99)
HCT VFR BLD AUTO: 45.1 % (ref 34–46.6)
HGB BLD-MCNC: 14.1 G/DL (ref 12–15.9)
IMM GRANULOCYTES # BLD AUTO: 0.01 10*3/MM3 (ref 0–0.05)
IMM GRANULOCYTES NFR BLD AUTO: 0.2 % (ref 0–0.5)
LYMPHOCYTES # BLD AUTO: 1.55 10*3/MM3 (ref 0.7–3.1)
LYMPHOCYTES NFR BLD AUTO: 35.1 % (ref 19.6–45.3)
MCH RBC QN AUTO: 30 PG (ref 26.6–33)
MCHC RBC AUTO-ENTMCNC: 31.3 G/DL (ref 31.5–35.7)
MCV RBC AUTO: 96 FL (ref 79–97)
MONOCYTES # BLD AUTO: 0.39 10*3/MM3 (ref 0.1–0.9)
MONOCYTES NFR BLD AUTO: 8.8 % (ref 5–12)
NEUTROPHILS # BLD AUTO: 2.28 10*3/MM3 (ref 1.7–7)
NEUTROPHILS NFR BLD AUTO: 51.8 % (ref 42.7–76)
NRBC BLD AUTO-RTO: 0 /100 WBC (ref 0–0.2)
PLATELET # BLD AUTO: 272 10*3/MM3 (ref 140–450)
POTASSIUM SERPL-SCNC: 4.6 MMOL/L (ref 3.5–5.2)
PROT SERPL-MCNC: 6.2 G/DL (ref 6–8.5)
RBC # BLD AUTO: 4.7 10*6/MM3 (ref 3.77–5.28)
SODIUM SERPL-SCNC: 140 MMOL/L (ref 136–145)
WBC # BLD AUTO: 4.41 10*3/MM3 (ref 3.4–10.8)

## 2019-08-11 LAB
APPEARANCE UR: CLEAR
BACTERIA #/AREA URNS HPF: NORMAL /HPF
BACTERIA UR CULT: ABNORMAL
BACTERIA UR CULT: ABNORMAL
BILIRUB UR QL STRIP: NEGATIVE
COLOR UR: YELLOW
EPI CELLS #/AREA URNS HPF: NORMAL /HPF
GLUCOSE UR QL: NEGATIVE
HGB UR QL STRIP: NEGATIVE
KETONES UR QL STRIP: NEGATIVE
LEUKOCYTE ESTERASE UR QL STRIP: ABNORMAL
MICRO URNS: ABNORMAL
MUCOUS THREADS URNS QL MICRO: PRESENT /HPF
NITRITE UR QL STRIP: NEGATIVE
PH UR STRIP: 6 [PH] (ref 5–7.5)
PROT UR QL STRIP: NEGATIVE
RBC #/AREA URNS HPF: NORMAL /HPF
SP GR UR: 1.02 (ref 1–1.03)
URINALYSIS REFLEX: ABNORMAL
UROBILINOGEN UR STRIP-MCNC: 0.2 MG/DL (ref 0.2–1)
WBC #/AREA URNS HPF: NORMAL /HPF

## 2019-08-12 ENCOUNTER — TELEPHONE (OUTPATIENT)
Dept: GASTROENTEROLOGY | Facility: CLINIC | Age: 74
End: 2019-08-12

## 2019-08-12 DIAGNOSIS — R10.84 GENERALIZED ABDOMINAL PAIN: ICD-10-CM

## 2019-08-12 DIAGNOSIS — R11.0 NAUSEA: Primary | ICD-10-CM

## 2019-08-12 NOTE — TELEPHONE ENCOUNTER
----- Message from Jamil Maciel MD sent at 8/12/2019  3:52 PM EDT -----  Tell her that her labs and urinalysis look good. If she is OK with this then please order a CT abd/pelvis with IV contrast to evaluate her abdominal pain. Please order and I will cosign.  [facility]

## 2019-08-13 NOTE — TELEPHONE ENCOUNTER
Called pt and advised per Dr Raheem tipton her labs and ua looked good. Pt verb understanding and would like to have ct scan of abd/pelvis to eval pain.  Advised pt someone from [facility] will call her to arrange.  Pt verb understanding.    Ct scan ordered.

## 2019-08-16 ENCOUNTER — HOSPITAL ENCOUNTER (OUTPATIENT)
Dept: CT IMAGING | Facility: HOSPITAL | Age: 74
Discharge: HOME | End: 2019-08-16
Admitting: INTERNAL MEDICINE

## 2019-08-16 DIAGNOSIS — R11.0 NAUSEA: ICD-10-CM

## 2019-08-16 DIAGNOSIS — R10.84 GENERALIZED ABDOMINAL PAIN: ICD-10-CM

## 2019-08-16 LAB — CREAT BLDA-MCNC: 0.6 MG/DL (ref 0.6–1.3)

## 2019-08-16 PROCEDURE — 74177 CT ABD & PELVIS W/CONTRAST: CPT

## 2019-08-16 PROCEDURE — 0 DIATRIZOATE MEGLUMINE & SODIUM PER 1 ML: Performed by: INTERNAL MEDICINE

## 2019-08-16 PROCEDURE — 25010000002 IOPAMIDOL 61 % SOLUTION: Performed by: INTERNAL MEDICINE

## 2019-08-16 PROCEDURE — 82565 ASSAY OF CREATININE: CPT

## 2019-08-16 RX ADMIN — IOPAMIDOL 85 ML: 612 INJECTION, SOLUTION INTRAVENOUS at 13:14

## 2019-08-16 RX ADMIN — DIATRIZOATE MEGLUMINE AND DIATRIZOATE SODIUM 30 ML: 660; 100 LIQUID ORAL; RECTAL at 13:14

## 2019-08-21 ENCOUNTER — OFFICE VISIT (OUTPATIENT)
Dept: GASTROENTEROLOGY | Facility: CLINIC | Age: 74
End: 2019-08-21

## 2019-08-21 VITALS
WEIGHT: 113.4 LBS | BODY MASS INDEX: 20.87 KG/M2 | HEIGHT: 62 IN | SYSTOLIC BLOOD PRESSURE: 102 MMHG | TEMPERATURE: 98 F | DIASTOLIC BLOOD PRESSURE: 66 MMHG

## 2019-08-21 DIAGNOSIS — R10.13 EPIGASTRIC PAIN: Primary | ICD-10-CM

## 2019-08-21 DIAGNOSIS — K63.5 POLYP OF COLON, UNSPECIFIED PART OF COLON, UNSPECIFIED TYPE: ICD-10-CM

## 2019-08-21 DIAGNOSIS — K22.70 BARRETT'S ESOPHAGUS WITHOUT DYSPLASIA: ICD-10-CM

## 2019-08-21 PROCEDURE — 99214 OFFICE O/P EST MOD 30 MIN: CPT | Performed by: INTERNAL MEDICINE

## 2019-08-21 RX ORDER — PHENOL 1.4 %
600 AEROSOL, SPRAY (ML) MUCOUS MEMBRANE DAILY
COMMUNITY
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
## 2019-08-26 ENCOUNTER — TELEPHONE (OUTPATIENT)
Dept: GASTROENTEROLOGY | Facility: CLINIC | Age: 74
End: 2019-08-26

## 2019-08-26 NOTE — TELEPHONE ENCOUNTER
----- Message from Carli Bhatia sent at 8/26/2019  1:08 PM EDT -----  Regarding: Report request  Contact: [phone]  PCP office is asking for the CT scan report on 8/16. Dr Tony Larose office [phone] [place] Fax: [phone]

## 2019-08-28 ENCOUNTER — HOSPITAL ENCOUNTER (OUTPATIENT)
Dept: ULTRASOUND IMAGING | Facility: HOSPITAL | Age: 74
Discharge: HOME | End: 2019-08-28
Admitting: INTERNAL MEDICINE

## 2019-08-28 DIAGNOSIS — R10.13 EPIGASTRIC PAIN: ICD-10-CM

## 2019-08-28 PROCEDURE — 76705 ECHO EXAM OF ABDOMEN: CPT

## 2019-08-29 ENCOUNTER — TELEPHONE (OUTPATIENT)
Dept: GASTROENTEROLOGY | Facility: CLINIC | Age: 74
End: 2019-08-29

## 2019-08-29 DIAGNOSIS — R10.84 GENERALIZED ABDOMINAL PAIN: Primary | ICD-10-CM

## 2019-08-29 NOTE — TELEPHONE ENCOUNTER
Call to pt.  Advise per DR Maciel that US of the GB showed tiny GB polyps (which usually dont' cause any trouble).  Because of the upper abd pain, recommend HIDA scan to look at GB function.  Pt had one in 2016.    Checking if talked to Dr Viktor Larose about abd CT with fluid around lungs/heart.  Dr Maciel was never able to reach Dr Larose.    Verb understanding.  Agreeable to HDA - advise that [facility] will contact to arrange.    States DR Larose office must have received CT because they contacted her to have lab work obtained - did this yesterday at [facility].    Order placed for HIDA - message to DR Maciel.

## 2019-08-29 NOTE — TELEPHONE ENCOUNTER
----- Message from Jamli Maciel MD sent at 8/29/2019  7:49 AM EDT -----  Tell her that her US of the gallbladder showed tiny gallbladder polyps (which usually don't cause any trouble). Because of the upper abdominal pain why don't we get a Kinevac Hida scan to look at the function of the gallbladder. She had one in 2016. If she is OK with this then please order it and I will cosign.       Also has she talked to Dr. Viktor Larose about her abnormal CT abd with the fluid around her lungs and heart? I was never able to reach Dr. Larose. Thx. kjh

## 2019-09-06 ENCOUNTER — HOSPITAL ENCOUNTER (OUTPATIENT)
Dept: NUCLEAR MEDICINE | Facility: HOSPITAL | Age: 74
Discharge: HOME | End: 2019-09-06

## 2019-09-06 DIAGNOSIS — R10.84 GENERALIZED ABDOMINAL PAIN: ICD-10-CM

## 2019-09-06 PROCEDURE — 0 TECHNETIUM TC 99M MEBROFENIN KIT: Performed by: INTERNAL MEDICINE

## 2019-09-06 PROCEDURE — A9537 TC99M MEBROFENIN: HCPCS | Performed by: INTERNAL MEDICINE

## 2019-09-06 PROCEDURE — 78226 HEPATOBILIARY SYSTEM IMAGING: CPT

## 2019-09-06 RX ORDER — KIT FOR THE PREPARATION OF TECHNETIUM TC 99M MEBROFENIN 45 MG/10ML
1 INJECTION, POWDER, LYOPHILIZED, FOR SOLUTION INTRAVENOUS
Status: COMPLETED | OUTPATIENT
Start: 2019-09-06 | End: 2019-09-06

## 2019-09-06 RX ADMIN — MEBROFENIN 1 DOSE: 45 INJECTION, POWDER, LYOPHILIZED, FOR SOLUTION INTRAVENOUS at 07:50

## 2019-09-19 ENCOUNTER — TELEPHONE (OUTPATIENT)
Dept: GASTROENTEROLOGY | Facility: CLINIC | Age: 74
End: 2019-09-19

## 2019-09-19 NOTE — TELEPHONE ENCOUNTER
----- Message from Jamil Maciel MD sent at 9/7/2019  2:26 PM EDT -----  Please tell her that her Kinevac HIDA scan showed a normal gallbladder ejection fraction at 95%.  A gallbladder ejection fraction of 35% or greater is considered normal.  Please fax a copy of this report to her PCP, Dr. Viktor Larose.  Thx. kjh

## 2019-09-19 NOTE — TELEPHONE ENCOUNTER
Call to pt.  Advise per Dr Maciel that HIDA showed a normal GB EF @ 95%.  35% or > is considered normal.  Verb understanding.    HIDA results faxed via epic to DR Brandon Larose.

## 2019-11-25 ENCOUNTER — OFFICE VISIT (OUTPATIENT)
Dept: GASTROENTEROLOGY | Facility: CLINIC | Age: 74
End: 2019-11-25

## 2019-11-25 VITALS
TEMPERATURE: 98.5 F | BODY MASS INDEX: 20.74 KG/M2 | DIASTOLIC BLOOD PRESSURE: 70 MMHG | HEIGHT: 62 IN | SYSTOLIC BLOOD PRESSURE: 110 MMHG

## 2019-11-25 DIAGNOSIS — K22.70 BARRETT'S ESOPHAGUS WITHOUT DYSPLASIA: Primary | ICD-10-CM

## 2019-11-25 DIAGNOSIS — K63.5 POLYP OF COLON, UNSPECIFIED PART OF COLON, UNSPECIFIED TYPE: ICD-10-CM

## 2019-11-25 DIAGNOSIS — R10.13 EPIGASTRIC PAIN: ICD-10-CM

## 2019-11-25 PROCEDURE — 99213 OFFICE O/P EST LOW 20 MIN: CPT | Performed by: INTERNAL MEDICINE
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
## 2019-12-16 ENCOUNTER — OFFICE VISIT (OUTPATIENT)
Dept: OBSTETRICS AND GYNECOLOGY | Age: 74
End: 2019-12-16

## 2019-12-16 VITALS
BODY MASS INDEX: 21.49 KG/M2 | SYSTOLIC BLOOD PRESSURE: 130 MMHG | DIASTOLIC BLOOD PRESSURE: 80 MMHG | HEIGHT: 62 IN | WEIGHT: 116.8 LBS

## 2019-12-16 DIAGNOSIS — L90.0 LICHEN SCLEROSUS ET ATROPHICUS: ICD-10-CM

## 2019-12-16 DIAGNOSIS — Z78.0 OSTEOPENIA AFTER MENOPAUSE: ICD-10-CM

## 2019-12-16 DIAGNOSIS — N95.2 VAGINAL ATROPHY: Primary | ICD-10-CM

## 2019-12-16 DIAGNOSIS — M85.80 OSTEOPENIA AFTER MENOPAUSE: ICD-10-CM

## 2019-12-16 DIAGNOSIS — Z12.31 SCREENING MAMMOGRAM, ENCOUNTER FOR: ICD-10-CM

## 2019-12-16 PROCEDURE — G0101 CA SCREEN;PELVIC/BREAST EXAM: HCPCS | Performed by: OBSTETRICS & GYNECOLOGY

## 2019-12-16 RX ORDER — RALOXIFENE HYDROCHLORIDE 60 MG/1
60 TABLET, FILM COATED ORAL DAILY
Qty: 30 TABLET | Refills: 12 | Status: SHIPPED | OUTPATIENT
Start: 2019-12-16 | End: 2020-01-29 | Stop reason: SDUPTHER
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
## 2020-01-29 ENCOUNTER — TELEPHONE (OUTPATIENT)
Dept: OBSTETRICS AND GYNECOLOGY | Age: 75
End: 2020-01-29

## 2020-01-29 DIAGNOSIS — Z78.0 OSTEOPENIA AFTER MENOPAUSE: ICD-10-CM

## 2020-01-29 DIAGNOSIS — M85.80 OSTEOPENIA AFTER MENOPAUSE: ICD-10-CM

## 2020-01-29 RX ORDER — RALOXIFENE HYDROCHLORIDE 60 MG/1
60 TABLET, FILM COATED ORAL DAILY
Qty: 30 TABLET | Refills: 12 | Status: CANCELLED | OUTPATIENT
Start: 2020-01-29

## 2020-01-29 NOTE — TELEPHONE ENCOUNTER
Patient is requesting a 90 day supply for her Evista.She went to her pharmacy yesterday @ [facility] and they told her to have us send in a rx for the 90 day supply instead of the 30 day.

## 2020-02-04 RX ORDER — RALOXIFENE HYDROCHLORIDE 60 MG/1
60 TABLET, FILM COATED ORAL DAILY
Qty: 90 TABLET | Refills: 3 | Status: SHIPPED | OUTPATIENT
Start: 2020-02-04 | End: 2021-03-09 | Stop reason: SDUPTHER

## 2020-06-17 RX ORDER — PANTOPRAZOLE SODIUM 40 MG/1
TABLET, DELAYED RELEASE ORAL
Qty: 90 TABLET | Refills: 0 | Status: SHIPPED | OUTPATIENT
Start: 2020-06-17 | End: 2020-07-10

## 2020-07-02 ENCOUNTER — OFFICE VISIT (OUTPATIENT)
Dept: GASTROENTEROLOGY | Facility: CLINIC | Age: 75
End: 2020-07-02

## 2020-07-02 VITALS — WEIGHT: 114 LBS | HEIGHT: 62 IN | TEMPERATURE: 97.6 F | BODY MASS INDEX: 20.98 KG/M2

## 2020-07-02 DIAGNOSIS — K22.70 BARRETT'S ESOPHAGUS WITHOUT DYSPLASIA: ICD-10-CM

## 2020-07-02 DIAGNOSIS — K59.00 CONSTIPATION, UNSPECIFIED CONSTIPATION TYPE: ICD-10-CM

## 2020-07-02 DIAGNOSIS — R10.13 EPIGASTRIC PAIN: ICD-10-CM

## 2020-07-02 DIAGNOSIS — R53.83 MALAISE AND FATIGUE: ICD-10-CM

## 2020-07-02 DIAGNOSIS — R53.81 MALAISE AND FATIGUE: ICD-10-CM

## 2020-07-02 DIAGNOSIS — K21.9 GASTROESOPHAGEAL REFLUX DISEASE, ESOPHAGITIS PRESENCE NOT SPECIFIED: Primary | ICD-10-CM

## 2020-07-02 LAB
ALBUMIN SERPL-MCNC: 4.3 G/DL (ref 3.5–5.2)
ALBUMIN/GLOB SERPL: 2 G/DL
ALP SERPL-CCNC: 55 U/L (ref 39–117)
ALT SERPL-CCNC: 11 U/L (ref 1–33)
AMYLASE SERPL-CCNC: 94 U/L (ref 28–100)
AST SERPL-CCNC: 14 U/L (ref 1–32)
BASOPHILS # BLD AUTO: 0.03 10*3/MM3 (ref 0–0.2)
BASOPHILS NFR BLD AUTO: 0.6 % (ref 0–1.5)
BILIRUB SERPL-MCNC: 0.2 MG/DL (ref 0.2–1.2)
BUN SERPL-MCNC: 17 MG/DL (ref 8–23)
BUN/CREAT SERPL: 22.1 (ref 7–25)
CALCIUM SERPL-MCNC: 9.5 MG/DL (ref 8.6–10.5)
CHLORIDE SERPL-SCNC: 104 MMOL/L (ref 98–107)
CO2 SERPL-SCNC: 25.3 MMOL/L (ref 22–29)
CREAT SERPL-MCNC: 0.77 MG/DL (ref 0.57–1)
EOSINOPHIL # BLD AUTO: 0.12 10*3/MM3 (ref 0–0.4)
EOSINOPHIL NFR BLD AUTO: 2.6 % (ref 0.3–6.2)
ERYTHROCYTE [DISTWIDTH] IN BLOOD BY AUTOMATED COUNT: 12.5 % (ref 12.3–15.4)
GLOBULIN SER CALC-MCNC: 2.1 GM/DL
GLUCOSE SERPL-MCNC: 88 MG/DL (ref 65–99)
HCT VFR BLD AUTO: 39.7 % (ref 34–46.6)
HGB BLD-MCNC: 13.1 G/DL (ref 12–15.9)
IMM GRANULOCYTES # BLD AUTO: 0.01 10*3/MM3 (ref 0–0.05)
IMM GRANULOCYTES NFR BLD AUTO: 0.2 % (ref 0–0.5)
LIPASE SERPL-CCNC: 43 U/L (ref 13–60)
LYMPHOCYTES # BLD AUTO: 1.51 10*3/MM3 (ref 0.7–3.1)
LYMPHOCYTES NFR BLD AUTO: 32.7 % (ref 19.6–45.3)
MCH RBC QN AUTO: 30.1 PG (ref 26.6–33)
MCHC RBC AUTO-ENTMCNC: 33 G/DL (ref 31.5–35.7)
MCV RBC AUTO: 91.3 FL (ref 79–97)
MONOCYTES # BLD AUTO: 0.38 10*3/MM3 (ref 0.1–0.9)
MONOCYTES NFR BLD AUTO: 8.2 % (ref 5–12)
NEUTROPHILS # BLD AUTO: 2.57 10*3/MM3 (ref 1.7–7)
NEUTROPHILS NFR BLD AUTO: 55.7 % (ref 42.7–76)
NRBC BLD AUTO-RTO: 0 /100 WBC (ref 0–0.2)
PLATELET # BLD AUTO: 315 10*3/MM3 (ref 140–450)
POTASSIUM SERPL-SCNC: 5.1 MMOL/L (ref 3.5–5.2)
PROT SERPL-MCNC: 6.4 G/DL (ref 6–8.5)
RBC # BLD AUTO: 4.35 10*6/MM3 (ref 3.77–5.28)
SODIUM SERPL-SCNC: 139 MMOL/L (ref 136–145)
TSH SERPL DL<=0.005 MIU/L-ACNC: 2.99 UIU/ML (ref 0.27–4.2)
VIT B12 SERPL-MCNC: 540 PG/ML (ref 211–946)
WBC # BLD AUTO: 4.62 10*3/MM3 (ref 3.4–10.8)

## 2020-07-02 PROCEDURE — 99214 OFFICE O/P EST MOD 30 MIN: CPT | Performed by: NURSE PRACTITIONER
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
## 2020-07-10 ENCOUNTER — TELEPHONE (OUTPATIENT)
Dept: GASTROENTEROLOGY | Facility: CLINIC | Age: 75
End: 2020-07-10

## 2020-07-10 RX ORDER — DEXLANSOPRAZOLE 60 MG/1
60 CAPSULE, DELAYED RELEASE ORAL DAILY
Qty: 30 CAPSULE | Refills: 11 | Status: SHIPPED | OUTPATIENT
Start: 2020-07-10 | End: 2020-07-24 | Stop reason: ALTCHOICE

## 2020-07-10 NOTE — TELEPHONE ENCOUNTER
Called pt and pt reports that Idania METZ gave her samples of dexilant to try.  She reports that they did work well.  She is asking if Idania wants her to continue this and if so she will need a script.  Advised will send message to Idania METZ.   Pt verb understanding.

## 2020-07-10 NOTE — TELEPHONE ENCOUNTER
----- Message from Sangeetha Freed sent at 7/10/2020 10:38 AM EDT -----  Contact: [phone]  Patient is needing a refill, did not say on what.  Please call

## 2020-07-16 ENCOUNTER — TELEPHONE (OUTPATIENT)
Dept: GASTROENTEROLOGY | Facility: CLINIC | Age: 75
End: 2020-07-16

## 2020-07-16 RX ORDER — LANSOPRAZOLE 30 MG/1
30 CAPSULE, DELAYED RELEASE ORAL 2 TIMES DAILY
Qty: 60 CAPSULE | Refills: 2 | Status: SHIPPED | OUTPATIENT
Start: 2020-07-16 | End: 2020-07-24 | Stop reason: SDUPTHER

## 2020-07-16 NOTE — TELEPHONE ENCOUNTER
Call to pt . Advise per M Williams note.  Verb understanding.       States dexilant cost is >$300/30 days.  States this is unaffordable. CT scheduled for 7/23.  Asking if dexilant would impact ct and if so, could she have samples.     Advise will send message to M Williams - may also need to change rx if unaffordable in the long term.  Verb understanding.

## 2020-07-16 NOTE — TELEPHONE ENCOUNTER
Called pt and advised of Idania 's note. Pt verb understanding and would like to try prevacid. ADvised we will escribe this to her [facility] pharmacy.

## 2020-07-16 NOTE — TELEPHONE ENCOUNTER
----- Message from WALLY Dumont sent at 7/8/2020 12:20 PM EDT -----  Please call the patient and let her know all of her labs look great.  White count and hemoglobin are normal.Amylase and lipase are normal.  Vitamin B12 is normal.  TSH is also normal.  Will await CT scan results.

## 2020-07-16 NOTE — TELEPHONE ENCOUNTER
I would say the closest thing to Dexilant would be Prevacid 30 mg twice daily.  If she is agreeable go ahead and change that prescription.  I do not have any samples of Dexilant currently or I would give her some.  Will await CT scan results.

## 2020-07-23 ENCOUNTER — HOSPITAL ENCOUNTER (OUTPATIENT)
Dept: CT IMAGING | Facility: HOSPITAL | Age: 75
Discharge: HOME | End: 2020-07-23
Admitting: NURSE PRACTITIONER

## 2020-07-23 DIAGNOSIS — K21.9 GASTROESOPHAGEAL REFLUX DISEASE, ESOPHAGITIS PRESENCE NOT SPECIFIED: ICD-10-CM

## 2020-07-23 DIAGNOSIS — R53.83 MALAISE AND FATIGUE: ICD-10-CM

## 2020-07-23 DIAGNOSIS — R10.13 EPIGASTRIC PAIN: ICD-10-CM

## 2020-07-23 DIAGNOSIS — R53.81 MALAISE AND FATIGUE: ICD-10-CM

## 2020-07-23 DIAGNOSIS — K22.70 BARRETT'S ESOPHAGUS WITHOUT DYSPLASIA: ICD-10-CM

## 2020-07-23 LAB — CREAT BLDA-MCNC: 0.7 MG/DL (ref 0.6–1.3)

## 2020-07-23 PROCEDURE — 25010000002 IOPAMIDOL 61 % SOLUTION: Performed by: NURSE PRACTITIONER

## 2020-07-23 PROCEDURE — 82565 ASSAY OF CREATININE: CPT

## 2020-07-23 PROCEDURE — 74177 CT ABD & PELVIS W/CONTRAST: CPT

## 2020-07-23 PROCEDURE — 0 DIATRIZOATE MEGLUMINE & SODIUM PER 1 ML: Performed by: NURSE PRACTITIONER

## 2020-07-23 RX ADMIN — IOPAMIDOL 100 ML: 612 INJECTION, SOLUTION INTRAVENOUS at 09:21

## 2020-07-23 RX ADMIN — DIATRIZOATE MEGLUMINE AND DIATRIZOATE SODIUM 30 ML: 600; 100 SOLUTION ORAL; RECTAL at 08:20

## 2020-07-24 ENCOUNTER — TELEPHONE (OUTPATIENT)
Dept: GASTROENTEROLOGY | Facility: CLINIC | Age: 75
End: 2020-07-24

## 2020-07-24 RX ORDER — LANSOPRAZOLE 30 MG/1
30 CAPSULE, DELAYED RELEASE ORAL 2 TIMES DAILY
Qty: 180 CAPSULE | Refills: 0 | Status: SHIPPED | OUTPATIENT
Start: 2020-07-24 | End: 2020-10-07 | Stop reason: SDUPTHER

## 2020-07-24 NOTE — TELEPHONE ENCOUNTER
Call to pt.  Advise per M Williams note.  Verb understanding.     States has picked up, but not started, lansoprazole.  Would like 90 day supply.  Escribe completed as requested, #180, R0.      [facility] will f/u with DR Maciel in Oct and pcp this month.     Update to M Williams.

## 2020-07-24 NOTE — TELEPHONE ENCOUNTER
----- Message from WALLY Dumont sent at 7/24/2020 10:02 AM EDT -----        Viry Randall  Preferred Name:   None  Female, 74 y.o., 1945  MRN:   [id number]  Phone:   [phone] (M)  MEDD:   0 mg  Allergies:   No Known Allergies  BestPractice Advisories:   None  HM Due?:   Due  Preferred Lab:   None  Next Appt Date by Dept:   10/07/2020  Next Appt:   None  PCP:   Brandon Larose MD  Primary Cvg:   MEDICARE/MEDICARE A & B  Next Appt  With Gastroenterology (Jamil Maciel MD)  10/07/2020 at 8:00 AM  PACS Images      Radiology Images  CT Abdomen Pelvis With Contrast   Order: [id number]   Status:  Final result   Visible to patient:  No (Not Released) Dx:  Epigastric pain; Anguiano's esophagus ...   Details       Reading Physician Reading Date Result Priority  Zain Bird MD 7/23/2020     Narrative & Impression    ABDOMEN AND PELVIS CT WITH CONTRAST     HISTORY: Epigastric abdominal pain. Reflux disease.     TECHNIQUE: Abdomen and pelvis CT was performed using enteric and IV  contrast. 100 mL of Isovue-300 IV contrast was used. This is correlated  with an abdomen and pelvis CT from 08/16/2019.     Radiation dose reduction techniques were utilized, including automated  exposure control and exposure modulation based on body size.     FINDINGS: Visualized lung bases are clear. The distal esophagus appears  normal. No hiatal hernia is evident on these images. The stomach is  opacified with enteric contrast and appears normal. The small bowel and  the large bowel appear normal. There is no inflammatory change. There  are heterogeneous, partially calcified lesions within the uterus,  consistent with fibroids. The largest measures about 4.3 cm diameter.     Parenchyma of the liver, pancreas, spleen, adrenals, and kidneys appears  normal. The gallbladder is in situ and has a normal CT appearance.     No bone or body wall lesion is identified.     IMPRESSION:  Uterine fibroids. Degenerative disc change  at L5-S1.  Otherwise negative abdomen and pelvis CT.     This report was finalized on 7/23/2020 9:34 PM by Dr. Zain Bird M.D.           Specimen Collected: 07/23/20 21:27 Last Resulted: 07/23/20 21:34      Order Details      View Encounter      Lab and Collection Details      Routing      Result History - Result Edited           Other Results from 7/23/2020        POC Creatinine   Order: [id number]     Status:  Final result   Visible to patient:  No (Not Released)   Specimen Information: Blood      Component  Ref Range & Units 1d ago 3wk ago  Creatinine  0.60 - 1.30 mg/dL 0.70  0.77 R  Comment: Serial Number: [id number]:  [id number]  Resulting Agency [id number]      Specimen Collected: 07/23/20 08:21 Last Resulted: 07/23/20 14:35       Lab Flowsheet      Order Details      View Encounter      Lab and Collection Details      Routing      Result History       R=Reference range differs from displayed range        Status of Other Orders     Completed    POC Creatinine  07/23/20       Routing History       Priority Sent On From To Message Type   7/23/2020  9:37 PM Interface, Rad Results Call In Idania Kramer APRN Results   7/23/2020  2:35 PM Lab, Background User Idania Kramer APRN Results       IMPRESSION:  Uterine fibroids. Degenerative disc change at L5-S1.  Otherwise negative abdomen and pelvis CT.    Please call the patient and let her know the results of her CT scan.  It was negative for any acute abdominal process.  Definitely showed some uterine fibroids and degenerative disc disease at L5-S1.  How is she doing?

## 2020-10-07 ENCOUNTER — OFFICE VISIT (OUTPATIENT)
Dept: GASTROENTEROLOGY | Facility: CLINIC | Age: 75
End: 2020-10-07

## 2020-10-07 VITALS — WEIGHT: 116.6 LBS | HEIGHT: 62 IN | TEMPERATURE: 97.1 F | BODY MASS INDEX: 21.46 KG/M2

## 2020-10-07 DIAGNOSIS — K22.70 BARRETT'S ESOPHAGUS WITHOUT DYSPLASIA: ICD-10-CM

## 2020-10-07 DIAGNOSIS — K63.5 POLYP OF COLON, UNSPECIFIED PART OF COLON, UNSPECIFIED TYPE: ICD-10-CM

## 2020-10-07 DIAGNOSIS — R00.0 TACHYCARDIA: ICD-10-CM

## 2020-10-07 DIAGNOSIS — R10.10 PAIN OF UPPER ABDOMEN: Primary | ICD-10-CM

## 2020-10-07 PROCEDURE — 99214 OFFICE O/P EST MOD 30 MIN: CPT | Performed by: INTERNAL MEDICINE

## 2020-10-07 RX ORDER — LANSOPRAZOLE 30 MG/1
30 CAPSULE, DELAYED RELEASE ORAL 2 TIMES DAILY
Qty: 180 CAPSULE | Refills: 3 | Status: SHIPPED | OUTPATIENT
Start: 2020-10-07 | End: 2021-01-07 | Stop reason: SDUPTHER
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
## 2020-10-13 ENCOUNTER — OFFICE VISIT (OUTPATIENT)
Dept: CARDIOLOGY | Facility: CLINIC | Age: 75
End: 2020-10-13

## 2020-10-13 VITALS
BODY MASS INDEX: 21.16 KG/M2 | WEIGHT: 115 LBS | DIASTOLIC BLOOD PRESSURE: 92 MMHG | SYSTOLIC BLOOD PRESSURE: 150 MMHG | HEIGHT: 62 IN | OXYGEN SATURATION: 99 % | HEART RATE: 71 BPM

## 2020-10-13 DIAGNOSIS — I10 ESSENTIAL HYPERTENSION: ICD-10-CM

## 2020-10-13 DIAGNOSIS — R00.2 PALPITATIONS: Primary | ICD-10-CM

## 2020-10-13 PROCEDURE — 99204 OFFICE O/P NEW MOD 45 MIN: CPT | Performed by: INTERNAL MEDICINE

## 2020-10-13 PROCEDURE — 93000 ELECTROCARDIOGRAM COMPLETE: CPT | Performed by: INTERNAL MEDICINE
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
## 2020-12-04 ENCOUNTER — HOSPITAL ENCOUNTER (OUTPATIENT)
Dept: CARDIOLOGY | Facility: HOSPITAL | Age: 75
Discharge: HOME | End: 2020-12-04
Admitting: INTERNAL MEDICINE

## 2020-12-04 VITALS — WEIGHT: 115 LBS | HEIGHT: 62 IN | BODY MASS INDEX: 21.16 KG/M2

## 2020-12-04 DIAGNOSIS — R00.2 PALPITATIONS: ICD-10-CM

## 2020-12-04 PROCEDURE — 93306 TTE W/DOPPLER COMPLETE: CPT | Performed by: INTERNAL MEDICINE

## 2020-12-04 PROCEDURE — 93306 TTE W/DOPPLER COMPLETE: CPT

## 2020-12-07 LAB
AORTIC ARCH: 2.4 CM
ASCENDING AORTA: 3.5 CM
BH CV ECHO MEAS - ACS: 2.1 CM
BH CV ECHO MEAS - AO MAX PG (FULL): 2.6 MMHG
BH CV ECHO MEAS - AO MAX PG: 5.9 MMHG
BH CV ECHO MEAS - AO MEAN PG (FULL): 1 MMHG
BH CV ECHO MEAS - AO MEAN PG: 3 MMHG
BH CV ECHO MEAS - AO ROOT AREA (BSA CORRECTED): 2.3
BH CV ECHO MEAS - AO ROOT AREA: 9.1 CM^2
BH CV ECHO MEAS - AO ROOT DIAM: 3.4 CM
BH CV ECHO MEAS - AO V2 MAX: 121 CM/SEC
BH CV ECHO MEAS - AO V2 MEAN: 83 CM/SEC
BH CV ECHO MEAS - AO V2 VTI: 24.6 CM
BH CV ECHO MEAS - ASC AORTA: 3.5 CM
BH CV ECHO MEAS - AVA(I,A): 2.9 CM^2
BH CV ECHO MEAS - AVA(I,D): 2.9 CM^2
BH CV ECHO MEAS - AVA(V,A): 2.8 CM^2
BH CV ECHO MEAS - AVA(V,D): 2.8 CM^2
BH CV ECHO MEAS - BSA(HAYCOCK): 1.5 M^2
BH CV ECHO MEAS - BSA: 1.5 M^2
BH CV ECHO MEAS - BZI_BMI: 21 KILOGRAMS/M^2
BH CV ECHO MEAS - BZI_METRIC_HEIGHT: 157.5 CM
BH CV ECHO MEAS - BZI_METRIC_WEIGHT: 52.2 KG
BH CV ECHO MEAS - EDV(MOD-SP2): 79 ML
BH CV ECHO MEAS - EDV(MOD-SP4): 78 ML
BH CV ECHO MEAS - EDV(TEICH): 70 ML
BH CV ECHO MEAS - EF(CUBED): 65.7 %
BH CV ECHO MEAS - EF(MOD-BP): 65 %
BH CV ECHO MEAS - EF(MOD-SP2): 64.6 %
BH CV ECHO MEAS - EF(MOD-SP4): 65.4 %
BH CV ECHO MEAS - EF(TEICH): 57.8 %
BH CV ECHO MEAS - ESV(MOD-SP2): 28 ML
BH CV ECHO MEAS - ESV(MOD-SP4): 27 ML
BH CV ECHO MEAS - ESV(TEICH): 29.6 ML
BH CV ECHO MEAS - FS: 30 %
BH CV ECHO MEAS - IVS/LVPW: 1
BH CV ECHO MEAS - IVSD: 1.1 CM
BH CV ECHO MEAS - LAT PEAK E' VEL: 6.4 CM/SEC
BH CV ECHO MEAS - LV DIASTOLIC VOL/BSA (35-75): 51.6 ML/M^2
BH CV ECHO MEAS - LV MASS(C)D: 145.6 GRAMS
BH CV ECHO MEAS - LV MASS(C)DI: 96.4 GRAMS/M^2
BH CV ECHO MEAS - LV MAX PG: 3.2 MMHG
BH CV ECHO MEAS - LV MEAN PG: 2 MMHG
BH CV ECHO MEAS - LV SYSTOLIC VOL/BSA (12-30): 17.9 ML/M^2
BH CV ECHO MEAS - LV V1 MAX: 90.1 CM/SEC
BH CV ECHO MEAS - LV V1 MEAN: 56.9 CM/SEC
BH CV ECHO MEAS - LV V1 VTI: 18.8 CM
BH CV ECHO MEAS - LVIDD: 4 CM
BH CV ECHO MEAS - LVIDS: 2.8 CM
BH CV ECHO MEAS - LVLD AP2: 6.7 CM
BH CV ECHO MEAS - LVLD AP4: 6.9 CM
BH CV ECHO MEAS - LVLS AP2: 5.8 CM
BH CV ECHO MEAS - LVLS AP4: 6.2 CM
BH CV ECHO MEAS - LVOT AREA (M): 3.8 CM^2
BH CV ECHO MEAS - LVOT AREA: 3.8 CM^2
BH CV ECHO MEAS - LVOT DIAM: 2.2 CM
BH CV ECHO MEAS - LVPWD: 1.1 CM
BH CV ECHO MEAS - MED PEAK E' VEL: 7.4 CM/SEC
BH CV ECHO MEAS - MR MAX PG: 22.7 MMHG
BH CV ECHO MEAS - MR MAX VEL: 238 CM/SEC
BH CV ECHO MEAS - MV A DUR: 0.13 SEC
BH CV ECHO MEAS - MV A MAX VEL: 101 CM/SEC
BH CV ECHO MEAS - MV DEC SLOPE: 300 CM/SEC^2
BH CV ECHO MEAS - MV DEC TIME: 0.26 SEC
BH CV ECHO MEAS - MV E MAX VEL: 73.3 CM/SEC
BH CV ECHO MEAS - MV E/A: 0.73
BH CV ECHO MEAS - MV MAX PG: 4 MMHG
BH CV ECHO MEAS - MV MEAN PG: 1 MMHG
BH CV ECHO MEAS - MV P1/2T MAX VEL: 82.8 CM/SEC
BH CV ECHO MEAS - MV P1/2T: 80.8 MSEC
BH CV ECHO MEAS - MV V2 MAX: 100 CM/SEC
BH CV ECHO MEAS - MV V2 MEAN: 56.8 CM/SEC
BH CV ECHO MEAS - MV V2 VTI: 26.8 CM
BH CV ECHO MEAS - MVA P1/2T LCG: 2.7 CM^2
BH CV ECHO MEAS - MVA(P1/2T): 2.7 CM^2
BH CV ECHO MEAS - MVA(VTI): 2.7 CM^2
BH CV ECHO MEAS - PA ACC TIME: 0.12 SEC
BH CV ECHO MEAS - PA MAX PG (FULL): 1.4 MMHG
BH CV ECHO MEAS - PA MAX PG: 3 MMHG
BH CV ECHO MEAS - PA PR(ACCEL): 26.8 MMHG
BH CV ECHO MEAS - PA V2 MAX: 86.9 CM/SEC
BH CV ECHO MEAS - PULM A REVS DUR: 0.11 SEC
BH CV ECHO MEAS - PULM A REVS VEL: 33.4 CM/SEC
BH CV ECHO MEAS - PULM DIAS VEL: 36.8 CM/SEC
BH CV ECHO MEAS - PULM S/D: 2
BH CV ECHO MEAS - PULM SYS VEL: 75 CM/SEC
BH CV ECHO MEAS - PVA(V,A): 3.3 CM^2
BH CV ECHO MEAS - PVA(V,D): 3.3 CM^2
BH CV ECHO MEAS - QP/QS: 0.88
BH CV ECHO MEAS - RAP SYSTOLE: 3 MMHG
BH CV ECHO MEAS - RV MAX PG: 1.6 MMHG
BH CV ECHO MEAS - RV MEAN PG: 1 MMHG
BH CV ECHO MEAS - RV V1 MAX: 62.7 CM/SEC
BH CV ECHO MEAS - RV V1 MEAN: 46.8 CM/SEC
BH CV ECHO MEAS - RV V1 VTI: 13.9 CM
BH CV ECHO MEAS - RVOT AREA: 4.5 CM^2
BH CV ECHO MEAS - RVOT DIAM: 2.4 CM
BH CV ECHO MEAS - RVSP: 17.9 MMHG
BH CV ECHO MEAS - SI(AO): 147.8 ML/M^2
BH CV ECHO MEAS - SI(CUBED): 27.8 ML/M^2
BH CV ECHO MEAS - SI(LVOT): 47.3 ML/M^2
BH CV ECHO MEAS - SI(MOD-SP2): 33.8 ML/M^2
BH CV ECHO MEAS - SI(MOD-SP4): 33.8 ML/M^2
BH CV ECHO MEAS - SI(TEICH): 26.8 ML/M^2
BH CV ECHO MEAS - SUP REN AO DIAM: 2.2 CM
BH CV ECHO MEAS - SV(AO): 223.3 ML
BH CV ECHO MEAS - SV(CUBED): 42 ML
BH CV ECHO MEAS - SV(LVOT): 71.5 ML
BH CV ECHO MEAS - SV(MOD-SP2): 51 ML
BH CV ECHO MEAS - SV(MOD-SP4): 51 ML
BH CV ECHO MEAS - SV(RVOT): 62.9 ML
BH CV ECHO MEAS - SV(TEICH): 40.4 ML
BH CV ECHO MEAS - TAPSE (>1.6): 2.7 CM
BH CV ECHO MEAS - TR MAX VEL: 193 CM/SEC
BH CV ECHO MEASUREMENTS AVERAGE E/E' RATIO: 10.62
BH CV XLRA - RV BASE: 2.8 CM
BH CV XLRA - RV LENGTH: 6.8 CM
BH CV XLRA - RV MID: 2.6 CM
BH CV XLRA - TDI S': 18.5 CM/SEC
LEFT ATRIUM VOLUME INDEX: 25 ML/M2
MAXIMAL PREDICTED HEART RATE: 144 BPM
SINUS: 3.2 CM
STJ: 2.6 CM
STRESS TARGET HR: 122 BPM

## 2020-12-07 NOTE — PROGRESS NOTES
Have called patient and discussed results with her.  Holter findings are overall benign and do not need any treatment.  She has a normal echocardiogram with no significant valvular dysfunction and preserved left ventricular ejection fraction and normal diastolic function.  She does need to be on Lasix/KCl.  Continue lisinopril.  If her blood pressure is elevated lisinopril dose can be increased.  She can be followed in cardiology clinic as needed and I have advised her to call with any questions or concerns.

## 2021-01-07 ENCOUNTER — OFFICE VISIT (OUTPATIENT)
Dept: GASTROENTEROLOGY | Facility: CLINIC | Age: 76
End: 2021-01-07

## 2021-01-07 VITALS
HEIGHT: 62 IN | TEMPERATURE: 97.8 F | BODY MASS INDEX: 21.94 KG/M2 | SYSTOLIC BLOOD PRESSURE: 130 MMHG | WEIGHT: 119.2 LBS | DIASTOLIC BLOOD PRESSURE: 88 MMHG

## 2021-01-07 DIAGNOSIS — K21.9 GASTROESOPHAGEAL REFLUX DISEASE, UNSPECIFIED WHETHER ESOPHAGITIS PRESENT: Primary | ICD-10-CM

## 2021-01-07 DIAGNOSIS — R51.9 FREQUENT HEADACHES: ICD-10-CM

## 2021-01-07 DIAGNOSIS — M54.89 OTHER BACK PAIN, UNSPECIFIED CHRONICITY: ICD-10-CM

## 2021-01-07 PROCEDURE — 99214 OFFICE O/P EST MOD 30 MIN: CPT | Performed by: INTERNAL MEDICINE

## 2021-01-07 RX ORDER — LANSOPRAZOLE 30 MG/1
30 CAPSULE, DELAYED RELEASE ORAL 2 TIMES DAILY
Qty: 180 CAPSULE | Refills: 3 | Status: SHIPPED | OUTPATIENT
Start: 2021-01-07 | End: 2021-10-27 | Stop reason: SDUPTHER
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
## 2021-01-08 LAB
ALBUMIN SERPL-MCNC: 4.2 G/DL (ref 3.5–5.2)
ALBUMIN/GLOB SERPL: 2 G/DL
ALP SERPL-CCNC: 88 U/L (ref 39–117)
ALT SERPL-CCNC: 15 U/L (ref 1–33)
AMYLASE SERPL-CCNC: 93 U/L (ref 28–100)
AST SERPL-CCNC: 22 U/L (ref 1–32)
BASOPHILS # BLD AUTO: 0.03 10*3/MM3 (ref 0–0.2)
BASOPHILS NFR BLD AUTO: 0.5 % (ref 0–1.5)
BILIRUB SERPL-MCNC: 0.2 MG/DL (ref 0–1.2)
BUN SERPL-MCNC: 14 MG/DL (ref 8–23)
BUN/CREAT SERPL: 20.3 (ref 7–25)
CALCIUM SERPL-MCNC: 8.9 MG/DL (ref 8.6–10.5)
CHLORIDE SERPL-SCNC: 105 MMOL/L (ref 98–107)
CO2 SERPL-SCNC: 25.9 MMOL/L (ref 22–29)
CREAT SERPL-MCNC: 0.69 MG/DL (ref 0.57–1)
EOSINOPHIL # BLD AUTO: 0.11 10*3/MM3 (ref 0–0.4)
EOSINOPHIL NFR BLD AUTO: 1.8 % (ref 0.3–6.2)
ERYTHROCYTE [DISTWIDTH] IN BLOOD BY AUTOMATED COUNT: 13.1 % (ref 12.3–15.4)
GLOBULIN SER CALC-MCNC: 2.1 GM/DL
GLUCOSE SERPL-MCNC: 95 MG/DL (ref 65–99)
HCT VFR BLD AUTO: 34 % (ref 34–46.6)
HGB BLD-MCNC: 10.7 G/DL (ref 12–15.9)
IMM GRANULOCYTES # BLD AUTO: 0.02 10*3/MM3 (ref 0–0.05)
IMM GRANULOCYTES NFR BLD AUTO: 0.3 % (ref 0–0.5)
LIPASE SERPL-CCNC: 35 U/L (ref 13–60)
LYMPHOCYTES # BLD AUTO: 2 10*3/MM3 (ref 0.7–3.1)
LYMPHOCYTES NFR BLD AUTO: 33.6 % (ref 19.6–45.3)
MCH RBC QN AUTO: 26 PG (ref 26.6–33)
MCHC RBC AUTO-ENTMCNC: 31.5 G/DL (ref 31.5–35.7)
MCV RBC AUTO: 82.5 FL (ref 79–97)
MONOCYTES # BLD AUTO: 0.56 10*3/MM3 (ref 0.1–0.9)
MONOCYTES NFR BLD AUTO: 9.4 % (ref 5–12)
NEUTROPHILS # BLD AUTO: 3.24 10*3/MM3 (ref 1.7–7)
NEUTROPHILS NFR BLD AUTO: 54.4 % (ref 42.7–76)
NRBC BLD AUTO-RTO: 0 /100 WBC (ref 0–0.2)
PLATELET # BLD AUTO: 392 10*3/MM3 (ref 140–450)
POTASSIUM SERPL-SCNC: 4.6 MMOL/L (ref 3.5–5.2)
PROT SERPL-MCNC: 6.3 G/DL (ref 6–8.5)
RBC # BLD AUTO: 4.12 10*6/MM3 (ref 3.77–5.28)
SODIUM SERPL-SCNC: 138 MMOL/L (ref 136–145)
WBC # BLD AUTO: 5.96 10*3/MM3 (ref 3.4–10.8)

## 2021-01-12 ENCOUNTER — TELEPHONE (OUTPATIENT)
Dept: GASTROENTEROLOGY | Facility: CLINIC | Age: 76
End: 2021-01-12

## 2021-01-12 DIAGNOSIS — D64.9 ANEMIA, UNSPECIFIED TYPE: Primary | ICD-10-CM

## 2021-01-12 NOTE — TELEPHONE ENCOUNTER
----- Message from Jamil Maciel MD sent at 1/12/2021  1:05 PM EST -----  01/12/21  Tell her that her lab work shows that she is mildly anemic with a hemoglobin of 10.7.  In 7 of 2020 her hemoglobin was 13.1.  Her comprehensive metabolic profile, amylase, and lipase were normal.  I would recommend that she come by the office to have the following labs drawn:  - CBC, ferritin, TIBC, B12, RBC folate.        I would also give her 3 stool Hemoccult cards and a malar to have her send us 3 stool specimens to test to see if there is blood in her stool?       As she had the MRI of the head, chest x-ray, and thoracic spine x-rays yet?  Fax a copy of this report to her PCP.  Ashley swanson

## 2021-01-12 NOTE — PROGRESS NOTES
01/12/21  Tell her that her lab work shows that she is mildly anemic with a hemoglobin of 10.7.  In 7 of 2020 her hemoglobin was 13.1.  Her comprehensive metabolic profile, amylase, and lipase were normal.  I would recommend that she come by the office to have the following labs drawn:  - CBC, ferritin, TIBC, B12, RBC folate.        I would also give her 3 stool Hemoccult cards and a malar to have her send us 3 stool specimens to test to see if there is blood in her stool?       As she had the MRI of the head, chest x-ray, and thoracic spine x-rays yet?  Fax a copy of this report to her PCP.  Ashley swanson

## 2021-01-12 NOTE — TELEPHONE ENCOUNTER
Called pt and advised of Dr Maciel's note. Pt verb understanding.   Pt made lab appt for Thursday 01/14 at 8a.  Pt to  hemoccult cards when she gets labs done.    Pt has not heard from [facility] to schedule imaging.  Advised pt she can call them at [phone] to arrange. Pt verb understanding.     Update sent to Dr Maciel.

## 2021-01-14 ENCOUNTER — LAB (OUTPATIENT)
Dept: GASTROENTEROLOGY | Facility: CLINIC | Age: 76
End: 2021-01-14

## 2021-01-14 LAB
BASOPHILS # BLD AUTO: 0.04 10*3/MM3 (ref 0–0.2)
BASOPHILS NFR BLD AUTO: 0.8 % (ref 0–1.5)
EOSINOPHIL # BLD AUTO: 0.14 10*3/MM3 (ref 0–0.4)
EOSINOPHIL NFR BLD AUTO: 2.9 % (ref 0.3–6.2)
ERYTHROCYTE [DISTWIDTH] IN BLOOD BY AUTOMATED COUNT: 13.5 % (ref 12.3–15.4)
FERRITIN SERPL-MCNC: 7.47 NG/ML (ref 13–150)
HCT VFR BLD AUTO: 34.7 % (ref 34–46.6)
HGB BLD-MCNC: 10.7 G/DL (ref 12–15.9)
IMM GRANULOCYTES # BLD AUTO: 0.01 10*3/MM3 (ref 0–0.05)
IMM GRANULOCYTES NFR BLD AUTO: 0.2 % (ref 0–0.5)
IRON SATN MFR SERPL: 6 % (ref 20–50)
IRON SERPL-MCNC: 31 MCG/DL (ref 37–145)
LYMPHOCYTES # BLD AUTO: 1.76 10*3/MM3 (ref 0.7–3.1)
LYMPHOCYTES NFR BLD AUTO: 36.7 % (ref 19.6–45.3)
MCH RBC QN AUTO: 25.5 PG (ref 26.6–33)
MCHC RBC AUTO-ENTMCNC: 30.8 G/DL (ref 31.5–35.7)
MCV RBC AUTO: 82.8 FL (ref 79–97)
MONOCYTES # BLD AUTO: 0.44 10*3/MM3 (ref 0.1–0.9)
MONOCYTES NFR BLD AUTO: 9.2 % (ref 5–12)
NEUTROPHILS # BLD AUTO: 2.4 10*3/MM3 (ref 1.7–7)
NEUTROPHILS NFR BLD AUTO: 50.2 % (ref 42.7–76)
NRBC BLD AUTO-RTO: 0 /100 WBC (ref 0–0.2)
PLATELET # BLD AUTO: 379 10*3/MM3 (ref 140–450)
RBC # BLD AUTO: 4.19 10*6/MM3 (ref 3.77–5.28)
TIBC SERPL-MCNC: 545 MCG/DL
UIBC SERPL-MCNC: 514 MCG/DL (ref 112–346)
VIT B12 SERPL-MCNC: 650 PG/ML (ref 211–946)
WBC # BLD AUTO: 4.79 10*3/MM3 (ref 3.4–10.8)

## 2021-01-15 ENCOUNTER — TELEPHONE (OUTPATIENT)
Dept: GASTROENTEROLOGY | Facility: CLINIC | Age: 76
End: 2021-01-15

## 2021-01-15 LAB
FOLATE BLD-MCNC: 417 NG/ML
FOLATE RBC-MCNC: 1202 NG/ML

## 2021-01-15 NOTE — TELEPHONE ENCOUNTER
Called pt and pt states that yesterday when she had her lab done they gave a speci hat. She is asking if she will need this. Advised that they gave her that to catch her stool for he hemoccult cards.   Pt verb understanding but is not home is not sure she got the cards. Pt will call us back if she did not receive.

## 2021-01-15 NOTE — TELEPHONE ENCOUNTER
----- Message from Brooke Way sent at 1/15/2021  3:28 PM EST -----  Regarding: talk to herminia  Contact: [phone]  Pt left a message wanting to talk to Herminia.

## 2021-01-18 ENCOUNTER — TELEPHONE (OUTPATIENT)
Dept: GASTROENTEROLOGY | Facility: CLINIC | Age: 76
End: 2021-01-18

## 2021-01-18 NOTE — TELEPHONE ENCOUNTER
01/17/21  Tell her that her labs show that she has iron deficiency anemia with a Hgb of 10.7. This could be from giving blood routinely? We will see what the stool hemoccult tests show. I would recommend that she start taking OTC iron sulfate 325 mg pills. She could take one pill by mouth daily. I would also take the iron pills with some vitamin C to increase absorption of the iron. I would have her have a repeat CBC and ferritin level two weeks after she starts taking the iron pills to make sure that her Hgb is coming back up? [email or web address]  [email or web address]. kj

## 2021-01-18 NOTE — TELEPHONE ENCOUNTER
----- Message from Jamil Maciel MD sent at 1/17/2021  9:24 PM EST -----  01/17/21  Tell her that her labs show that she has iron deficiency anemia with a Hgb of 10.7. This could be from giving blood routinely? We will see what the stool hemoccult tests show. I would recommend that she start taking OTC iron sulfate 325 mg pills. She could take one pill by mouth daily. I would also take the iron pills with some vitamin C to increase absorption of the iron. I would have her have a repeat CBC and ferritin level two weeks after she starts taking the iron pills to make sure that her Hgb is coming back up? [email or web address]. [email or web address]

## 2021-01-22 ENCOUNTER — TELEPHONE (OUTPATIENT)
Dept: GASTROENTEROLOGY | Facility: CLINIC | Age: 76
End: 2021-01-22

## 2021-01-22 NOTE — TELEPHONE ENCOUNTER
There were 3 hemoccult cards processed in office today.  There were zero positive and three negative.

## 2021-01-22 NOTE — TELEPHONE ENCOUNTER
I tried to call Ms. Randall but did not get an answer.  Please call and tell Ms. Randall that these 3 stool Hemoccult cards were all negative for blood, which is good.  Certainly iron deficiency anemia could be from donating blood regularly?  The question in my mind is should we assume that that is what this is or should we do a colonoscopy to make sure that nothing has grown in the colon (such as a polyp or a cancer?) since she last had a colonoscopy? Could she intermittently have GI bleeding but it is so little that she cannot see it?  I certainly do not want to do unnecessary procedures.  On the other hand I do not want you to develop colon cancer and we do not diagnose it promptly? Ask her what her thoughts are on this? Thx. kjh

## 2021-01-23 ENCOUNTER — HOSPITAL ENCOUNTER (OUTPATIENT)
Dept: GENERAL RADIOLOGY | Facility: HOSPITAL | Age: 76
Discharge: HOME | End: 2021-01-23

## 2021-01-23 ENCOUNTER — HOSPITAL ENCOUNTER (OUTPATIENT)
Dept: MRI IMAGING | Facility: HOSPITAL | Age: 76
Discharge: HOME | End: 2021-01-23

## 2021-01-23 DIAGNOSIS — M54.89 OTHER BACK PAIN, UNSPECIFIED CHRONICITY: ICD-10-CM

## 2021-01-23 DIAGNOSIS — R51.9 FREQUENT HEADACHES: ICD-10-CM

## 2021-01-23 PROCEDURE — 71046 X-RAY EXAM CHEST 2 VIEWS: CPT

## 2021-01-23 PROCEDURE — 72070 X-RAY EXAM THORAC SPINE 2VWS: CPT

## 2021-01-23 PROCEDURE — 82565 ASSAY OF CREATININE: CPT

## 2021-01-23 PROCEDURE — 0 GADOBENATE DIMEGLUMINE 529 MG/ML SOLUTION: Performed by: INTERNAL MEDICINE

## 2021-01-23 PROCEDURE — 70553 MRI BRAIN STEM W/O & W/DYE: CPT

## 2021-01-23 PROCEDURE — A9577 INJ MULTIHANCE: HCPCS | Performed by: INTERNAL MEDICINE

## 2021-01-23 RX ADMIN — GADOBENATE DIMEGLUMINE 10 ML: 529 INJECTION, SOLUTION INTRAVENOUS at 10:18

## 2021-01-24 LAB — CREAT BLDA-MCNC: 0.7 MG/DL (ref 0.6–1.3)

## 2021-01-25 ENCOUNTER — TELEPHONE (OUTPATIENT)
Dept: GASTROENTEROLOGY | Facility: CLINIC | Age: 76
End: 2021-01-25

## 2021-01-25 NOTE — TELEPHONE ENCOUNTER
Called pt and advised of Dr Maciel's note.  Pt verb understanding but is currently taking care of her very ill brother and will call us back.  Pt does report that she had her imaging done on 01/23/2021.  Update sent to Dr Maciel.

## 2021-01-25 NOTE — TELEPHONE ENCOUNTER
Please send to her PCP (Dr. Viktor Larose) copies of her recent lab work, the report of the MRI of the head, chest x-ray and x-ray of the thoracic spine. Ashley swanson

## 2021-02-18 ENCOUNTER — TELEPHONE (OUTPATIENT)
Dept: GASTROENTEROLOGY | Facility: CLINIC | Age: 76
End: 2021-02-18

## 2021-02-18 DIAGNOSIS — D64.9 ANEMIA, UNSPECIFIED TYPE: Primary | ICD-10-CM

## 2021-02-18 DIAGNOSIS — K63.5 POLYP OF COLON, UNSPECIFIED PART OF COLON, UNSPECIFIED TYPE: ICD-10-CM

## 2021-02-18 NOTE — TELEPHONE ENCOUNTER
----- Message from Brooke Isabel sent at 2/18/2021  2:57 PM EST -----  Regarding: Dr Maciel request  Contact: [phone]  Dr Maciel wants to know the dates that pt gave blood. Please advise Dr Maciel. Thank you      4//11/2020 6/18/2020 9/27/2020 12/03/20

## 2021-02-18 NOTE — TELEPHONE ENCOUNTER
I called her but could only leave a message.   Tell her that her iron deficiency anemia could be from donating blood often. On the other hand I don't want to miss a colon cancer? It has been just shy of 5 yrs since your last colonoscopy. Should we do another colonoscopy (and do an EGD at the same time) to further evaluate your iron deficiency anemia? If so then let's schedule it. If she wants to come talk to me in the office then schedule her to see me.

## 2021-02-19 NOTE — TELEPHONE ENCOUNTER
Call to pt.  Advise per DR Gautam note.  Verb understanding.  Agreeable to egd/c/s.  Advise Scheduling will contact to arrange     Case request placed - message to Dr Maciel.

## 2021-02-23 PROBLEM — D64.9 ANEMIA: Status: ACTIVE | Noted: 2021-02-23

## 2021-02-23 PROBLEM — K63.5 POLYP OF COLON: Status: ACTIVE | Noted: 2021-02-23

## 2021-03-02 DIAGNOSIS — Z23 IMMUNIZATION DUE: ICD-10-CM

## 2021-03-09 ENCOUNTER — TELEPHONE (OUTPATIENT)
Dept: OBSTETRICS AND GYNECOLOGY | Age: 76
End: 2021-03-09

## 2021-03-09 DIAGNOSIS — Z78.0 OSTEOPENIA AFTER MENOPAUSE: ICD-10-CM

## 2021-03-09 DIAGNOSIS — M85.80 OSTEOPENIA AFTER MENOPAUSE: ICD-10-CM

## 2021-03-09 RX ORDER — RALOXIFENE HYDROCHLORIDE 60 MG/1
60 TABLET, FILM COATED ORAL DAILY
Qty: 90 TABLET | Refills: 3 | Status: SHIPPED | OUTPATIENT
Start: 2021-03-09 | End: 2021-04-01 | Stop reason: SDUPTHER

## 2021-03-09 NOTE — TELEPHONE ENCOUNTER
Janessa pt    Pt stated that she ran out of the medication Evista in march. Pt stated that if you think she still needs to stay on the medication she would like a refill. Pharmacy verified please advise    [phone]

## 2021-03-29 ENCOUNTER — TRANSCRIBE ORDERS (OUTPATIENT)
Dept: SLEEP MEDICINE | Facility: HOSPITAL | Age: 76
End: 2021-03-29

## 2021-03-29 DIAGNOSIS — Z01.818 OTHER SPECIFIED PRE-OPERATIVE EXAMINATION: Primary | ICD-10-CM

## 2021-04-01 ENCOUNTER — PROCEDURE VISIT (OUTPATIENT)
Dept: OBSTETRICS AND GYNECOLOGY | Age: 76
End: 2021-04-01

## 2021-04-01 ENCOUNTER — APPOINTMENT (OUTPATIENT)
Dept: WOMENS IMAGING | Facility: HOSPITAL | Age: 76
End: 2021-04-01

## 2021-04-01 ENCOUNTER — OFFICE VISIT (OUTPATIENT)
Dept: OBSTETRICS AND GYNECOLOGY | Age: 76
End: 2021-04-01

## 2021-04-01 VITALS
BODY MASS INDEX: 21.6 KG/M2 | WEIGHT: 117.4 LBS | SYSTOLIC BLOOD PRESSURE: 134 MMHG | HEIGHT: 62 IN | DIASTOLIC BLOOD PRESSURE: 80 MMHG

## 2021-04-01 DIAGNOSIS — Z78.0 OSTEOPENIA AFTER MENOPAUSE: ICD-10-CM

## 2021-04-01 DIAGNOSIS — M85.80 OSTEOPENIA AFTER MENOPAUSE: ICD-10-CM

## 2021-04-01 DIAGNOSIS — Z12.31 VISIT FOR SCREENING MAMMOGRAM: ICD-10-CM

## 2021-04-01 DIAGNOSIS — L90.0 LICHEN SCLEROSUS ET ATROPHICUS: ICD-10-CM

## 2021-04-01 DIAGNOSIS — L90.0 LICHEN SCLEROSUS ET ATROPHICUS: Primary | ICD-10-CM

## 2021-04-01 DIAGNOSIS — Z78.0 POST-MENOPAUSAL: Primary | ICD-10-CM

## 2021-04-01 DIAGNOSIS — Z01.419 ENCOUNTER FOR GYNECOLOGICAL EXAMINATION WITHOUT ABNORMAL FINDING: ICD-10-CM

## 2021-04-01 PROCEDURE — 77067 SCR MAMMO BI INCL CAD: CPT | Performed by: OBSTETRICS & GYNECOLOGY

## 2021-04-01 PROCEDURE — 77080 DXA BONE DENSITY AXIAL: CPT | Performed by: OBSTETRICS & GYNECOLOGY

## 2021-04-01 PROCEDURE — 77067 SCR MAMMO BI INCL CAD: CPT | Performed by: RADIOLOGY

## 2021-04-01 PROCEDURE — G0101 CA SCREEN;PELVIC/BREAST EXAM: HCPCS | Performed by: OBSTETRICS & GYNECOLOGY

## 2021-04-01 RX ORDER — RALOXIFENE HYDROCHLORIDE 60 MG/1
60 TABLET, FILM COATED ORAL DAILY
Qty: 90 TABLET | Refills: 3 | Status: SHIPPED | OUTPATIENT
Start: 2021-04-01 | End: 2022-08-24 | Stop reason: SDUPTHER
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
## 2021-04-13 ENCOUNTER — LAB (OUTPATIENT)
Dept: LAB | Facility: HOSPITAL | Age: 76
End: 2021-04-13

## 2021-04-13 DIAGNOSIS — Z01.818 OTHER SPECIFIED PRE-OPERATIVE EXAMINATION: ICD-10-CM

## 2021-04-13 PROCEDURE — C9803 HOPD COVID-19 SPEC COLLECT: HCPCS

## 2021-04-13 PROCEDURE — U0005 INFEC AGEN DETEC AMPLI PROBE: HCPCS

## 2021-04-13 PROCEDURE — U0004 COV-19 TEST NON-CDC HGH THRU: HCPCS

## 2021-04-14 ENCOUNTER — TELEPHONE (OUTPATIENT)
Dept: GASTROENTEROLOGY | Facility: CLINIC | Age: 76
End: 2021-04-14

## 2021-04-14 LAB — SARS-COV-2 RNA RESP QL NAA+PROBE: NOT DETECTED

## 2021-04-14 NOTE — TELEPHONE ENCOUNTER
----- Message from Brooke Isabel sent at 4/14/2021  8:58 AM EDT -----  Regarding: anxiety  Contact: [phone]  Please call pt concerning her anxiety and her EGD tomorrow. Thank you

## 2021-04-14 NOTE — TELEPHONE ENCOUNTER
Call to pt.  States under extreme amount of stress 2nd family issues.  Because of this, needs to delay scopes scheduled for tomorrow.     Advise Scheduling will contact to reschedule.      Message to Puma PEREZ

## 2021-04-28 ENCOUNTER — TELEPHONE (OUTPATIENT)
Dept: OBSTETRICS AND GYNECOLOGY | Age: 76
End: 2021-04-28

## 2021-05-20 ENCOUNTER — TRANSCRIBE ORDERS (OUTPATIENT)
Dept: ADMINISTRATIVE | Facility: HOSPITAL | Age: 76
End: 2021-05-20

## 2021-05-20 DIAGNOSIS — Z20.822 ENCOUNTER FOR SCREENING LABORATORY TESTING FOR COVID-19 VIRUS: Primary | ICD-10-CM

## 2021-06-04 ENCOUNTER — TRANSCRIBE ORDERS (OUTPATIENT)
Dept: LAB | Facility: HOSPITAL | Age: 76
End: 2021-06-04

## 2021-06-04 DIAGNOSIS — Z01.818 OTHER SPECIFIED PRE-OPERATIVE EXAMINATION: Primary | ICD-10-CM

## 2021-06-15 ENCOUNTER — LAB (OUTPATIENT)
Dept: LAB | Facility: HOSPITAL | Age: 76
End: 2021-06-15

## 2021-06-15 DIAGNOSIS — Z01.818 OTHER SPECIFIED PRE-OPERATIVE EXAMINATION: ICD-10-CM

## 2021-06-15 LAB — SARS-COV-2 ORF1AB RESP QL NAA+PROBE: NOT DETECTED

## 2021-06-15 PROCEDURE — U0005 INFEC AGEN DETEC AMPLI PROBE: HCPCS

## 2021-06-15 PROCEDURE — U0004 COV-19 TEST NON-CDC HGH THRU: HCPCS

## 2021-06-15 PROCEDURE — C9803 HOPD COVID-19 SPEC COLLECT: HCPCS

## 2021-06-16 ENCOUNTER — TELEPHONE (OUTPATIENT)
Dept: GASTROENTEROLOGY | Facility: CLINIC | Age: 76
End: 2021-06-16

## 2021-06-16 RX ORDER — FLUOXETINE HYDROCHLORIDE 20 MG/1
CAPSULE ORAL
COMMUNITY
Start: 2021-05-07

## 2021-06-16 NOTE — TELEPHONE ENCOUNTER
Called pt and pt reports that she did not look at her prep instructions closely and did take an iron supp up until 06/14.  She is asking if ok to still have scope.  Spoke with Idania NP who advised that pt can still have scope. Advised pt of this .  Pt verb understanding.     Update sent to Dr Maciel.

## 2021-06-16 NOTE — TELEPHONE ENCOUNTER
----- Message from Brooke Tyson Rep sent at 6/16/2021  9:17 AM EDT -----  Regarding: Procedure  Contact: [phone]  Pt has some questions about meds she did not stop taking before her procedure

## 2021-06-17 ENCOUNTER — ANESTHESIA EVENT (OUTPATIENT)
Dept: GASTROENTEROLOGY | Facility: HOSPITAL | Age: 76
End: 2021-06-17

## 2021-06-17 ENCOUNTER — ANESTHESIA (OUTPATIENT)
Dept: GASTROENTEROLOGY | Facility: HOSPITAL | Age: 76
End: 2021-06-17

## 2021-06-17 ENCOUNTER — HOSPITAL ENCOUNTER (OUTPATIENT)
Facility: HOSPITAL | Age: 76
Setting detail: HOSPITAL OUTPATIENT SURGERY
Discharge: HOME | End: 2021-06-17
Attending: INTERNAL MEDICINE | Admitting: INTERNAL MEDICINE

## 2021-06-17 VITALS
DIASTOLIC BLOOD PRESSURE: 87 MMHG | WEIGHT: 112 LBS | RESPIRATION RATE: 17 BRPM | HEART RATE: 74 BPM | SYSTOLIC BLOOD PRESSURE: 141 MMHG | BODY MASS INDEX: 19.12 KG/M2 | OXYGEN SATURATION: 100 % | HEIGHT: 64 IN

## 2021-06-17 DIAGNOSIS — D64.9 ANEMIA, UNSPECIFIED TYPE: ICD-10-CM

## 2021-06-17 DIAGNOSIS — K63.5 POLYP OF COLON, UNSPECIFIED PART OF COLON, UNSPECIFIED TYPE: ICD-10-CM

## 2021-06-17 LAB
ALBUMIN SERPL-MCNC: 4.5 G/DL (ref 3.5–5.2)
ALBUMIN/GLOB SERPL: 1.9 G/DL
ALP SERPL-CCNC: 73 U/L (ref 39–117)
ALT SERPL W P-5'-P-CCNC: 15 U/L (ref 1–33)
ANION GAP SERPL CALCULATED.3IONS-SCNC: 12.4 MMOL/L (ref 5–15)
AST SERPL-CCNC: 20 U/L (ref 1–32)
BASOPHILS # BLD AUTO: 0.02 10*3/MM3 (ref 0–0.2)
BASOPHILS NFR BLD AUTO: 0.3 % (ref 0–1.5)
BILIRUB SERPL-MCNC: 0.5 MG/DL (ref 0–1.2)
BUN SERPL-MCNC: 15 MG/DL (ref 8–23)
BUN/CREAT SERPL: 21.7 (ref 7–25)
CALCIUM SPEC-SCNC: 8.7 MG/DL (ref 8.6–10.5)
CHLORIDE SERPL-SCNC: 99 MMOL/L (ref 98–107)
CO2 SERPL-SCNC: 24.6 MMOL/L (ref 22–29)
CREAT SERPL-MCNC: 0.69 MG/DL (ref 0.57–1)
DEPRECATED RDW RBC AUTO: 49.9 FL (ref 37–54)
EOSINOPHIL # BLD AUTO: 0.11 10*3/MM3 (ref 0–0.4)
EOSINOPHIL NFR BLD AUTO: 1.6 % (ref 0.3–6.2)
ERYTHROCYTE [DISTWIDTH] IN BLOOD BY AUTOMATED COUNT: 15.9 % (ref 12.3–15.4)
FERRITIN SERPL-MCNC: 46.8 NG/ML (ref 13–150)
GFR SERPL CREATININE-BSD FRML MDRD: 83 ML/MIN/1.73
GLOBULIN UR ELPH-MCNC: 2.4 GM/DL
GLUCOSE SERPL-MCNC: 111 MG/DL (ref 65–99)
HCT VFR BLD AUTO: 45 % (ref 34–46.6)
HGB BLD-MCNC: 15.2 G/DL (ref 12–15.9)
IMM GRANULOCYTES # BLD AUTO: 0.02 10*3/MM3 (ref 0–0.05)
IMM GRANULOCYTES NFR BLD AUTO: 0.3 % (ref 0–0.5)
IRON 24H UR-MRATE: 114 MCG/DL (ref 37–145)
IRON SATN MFR SERPL: 25 % (ref 20–50)
LYMPHOCYTES # BLD AUTO: 2.54 10*3/MM3 (ref 0.7–3.1)
LYMPHOCYTES NFR BLD AUTO: 35.9 % (ref 19.6–45.3)
MAGNESIUM SERPL-MCNC: 2 MG/DL (ref 1.6–2.4)
MCH RBC QN AUTO: 29.2 PG (ref 26.6–33)
MCHC RBC AUTO-ENTMCNC: 33.8 G/DL (ref 31.5–35.7)
MCV RBC AUTO: 86.5 FL (ref 79–97)
MONOCYTES # BLD AUTO: 0.48 10*3/MM3 (ref 0.1–0.9)
MONOCYTES NFR BLD AUTO: 6.8 % (ref 5–12)
NEUTROPHILS NFR BLD AUTO: 3.91 10*3/MM3 (ref 1.7–7)
NEUTROPHILS NFR BLD AUTO: 55.1 % (ref 42.7–76)
NRBC BLD AUTO-RTO: 0 /100 WBC (ref 0–0.2)
PLATELET # BLD AUTO: 318 10*3/MM3 (ref 140–450)
PMV BLD AUTO: 10.1 FL (ref 6–12)
POTASSIUM SERPL-SCNC: 3.5 MMOL/L (ref 3.5–5.2)
PROT SERPL-MCNC: 6.9 G/DL (ref 6–8.5)
RBC # BLD AUTO: 5.2 10*6/MM3 (ref 3.77–5.28)
SODIUM SERPL-SCNC: 136 MMOL/L (ref 136–145)
TIBC SERPL-MCNC: 463 MCG/DL (ref 298–536)
TRANSFERRIN SERPL-MCNC: 311 MG/DL (ref 200–360)
TSH SERPL DL<=0.05 MIU/L-ACNC: 2.91 UIU/ML (ref 0.27–4.2)
WBC # BLD AUTO: 7.08 10*3/MM3 (ref 3.4–10.8)

## 2021-06-17 PROCEDURE — 86255 FLUORESCENT ANTIBODY SCREEN: CPT | Performed by: INTERNAL MEDICINE

## 2021-06-17 PROCEDURE — 83735 ASSAY OF MAGNESIUM: CPT | Performed by: INTERNAL MEDICINE

## 2021-06-17 PROCEDURE — 88305 TISSUE EXAM BY PATHOLOGIST: CPT | Performed by: INTERNAL MEDICINE

## 2021-06-17 PROCEDURE — 82784 ASSAY IGA/IGD/IGG/IGM EACH: CPT | Performed by: INTERNAL MEDICINE

## 2021-06-17 PROCEDURE — 83516 IMMUNOASSAY NONANTIBODY: CPT | Performed by: INTERNAL MEDICINE

## 2021-06-17 PROCEDURE — 85025 COMPLETE CBC W/AUTO DIFF WBC: CPT | Performed by: INTERNAL MEDICINE

## 2021-06-17 PROCEDURE — 80053 COMPREHEN METABOLIC PANEL: CPT | Performed by: INTERNAL MEDICINE

## 2021-06-17 PROCEDURE — 25010000002 PROPOFOL 10 MG/ML EMULSION: Performed by: NURSE ANESTHETIST, CERTIFIED REGISTERED

## 2021-06-17 PROCEDURE — 43239 EGD BIOPSY SINGLE/MULTIPLE: CPT | Performed by: INTERNAL MEDICINE

## 2021-06-17 PROCEDURE — 83540 ASSAY OF IRON: CPT | Performed by: INTERNAL MEDICINE

## 2021-06-17 PROCEDURE — 82728 ASSAY OF FERRITIN: CPT | Performed by: INTERNAL MEDICINE

## 2021-06-17 PROCEDURE — 84443 ASSAY THYROID STIM HORMONE: CPT | Performed by: INTERNAL MEDICINE

## 2021-06-17 PROCEDURE — 84466 ASSAY OF TRANSFERRIN: CPT | Performed by: INTERNAL MEDICINE

## 2021-06-17 PROCEDURE — G0105 COLORECTAL SCRN; HI RISK IND: HCPCS | Performed by: INTERNAL MEDICINE

## 2021-06-17 RX ORDER — LIDOCAINE HYDROCHLORIDE 20 MG/ML
INJECTION, SOLUTION INFILTRATION; PERINEURAL AS NEEDED
Status: DISCONTINUED | OUTPATIENT
Start: 2021-06-17 | End: 2021-06-17 | Stop reason: SURG

## 2021-06-17 RX ORDER — GLYCOPYRROLATE 0.2 MG/ML
INJECTION INTRAMUSCULAR; INTRAVENOUS AS NEEDED
Status: DISCONTINUED | OUTPATIENT
Start: 2021-06-17 | End: 2021-06-17 | Stop reason: SURG

## 2021-06-17 RX ORDER — PROPOFOL 10 MG/ML
VIAL (ML) INTRAVENOUS CONTINUOUS PRN
Status: DISCONTINUED | OUTPATIENT
Start: 2021-06-17 | End: 2021-06-17 | Stop reason: SURG

## 2021-06-17 RX ORDER — SODIUM CHLORIDE, SODIUM LACTATE, POTASSIUM CHLORIDE, CALCIUM CHLORIDE 600; 310; 30; 20 MG/100ML; MG/100ML; MG/100ML; MG/100ML
1000 INJECTION, SOLUTION INTRAVENOUS CONTINUOUS
Status: DISCONTINUED | OUTPATIENT
Start: 2021-06-17 | End: 2021-06-17 | Stop reason: HOSPADM

## 2021-06-17 RX ADMIN — LIDOCAINE HYDROCHLORIDE 60 MG: 20 INJECTION, SOLUTION INFILTRATION; PERINEURAL at 15:13

## 2021-06-17 RX ADMIN — GLYCOPYRROLATE 0.2 MG: 0.2 INJECTION INTRAMUSCULAR; INTRAVENOUS at 15:08

## 2021-06-17 RX ADMIN — PROPOFOL 180 MCG/KG/MIN: 10 INJECTION, EMULSION INTRAVENOUS at 15:13

## 2021-06-17 RX ADMIN — SODIUM CHLORIDE, POTASSIUM CHLORIDE, SODIUM LACTATE AND CALCIUM CHLORIDE 1000 ML: 600; 310; 30; 20 INJECTION, SOLUTION INTRAVENOUS at 13:58

## 2021-06-17 NOTE — DISCHARGE INSTR - LAB
For the next 24 hours patient needs to be with a responsible adult.    For 24 hours DO NOT drive, operate machinery, appliances, drink alcohol, make important decisions or sign legal documents.    Start with a light or bland diet if you are feeling sick to your stomach otherwise advance to regular diet as tolerated.    Follow recommendations on procedure report if provided by your doctor.    Call Dr Maciel for problems [phone]    Problems may include but not limited to: large amounts of bleeding, trouble breathing, repeated vomiting, severe unrelieved pain, fever or chills.

## 2021-06-17 NOTE — DISCHARGE INSTRUCTIONS
For the next 24 hours patient needs to be with a responsible adult.    For 24 hours DO NOT drive, operate machinery, appliances, drink alcohol, make important decisions or sign legal documents.    Start with a light or bland diet if you are feeling sick to your stomach otherwise advance to regular diet as tolerated.    Follow recommendations on procedure report if provided by your doctor.    Call Dr Maciel for problems [phone]    Problems may include but not limited to: large amounts of bleeding, trouble breathing, repeated vomiting, severe unrelieved pain, fever or chills.
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
## 2021-06-18 LAB
ENDOMYSIUM IGA SER QL: NEGATIVE
GLIADIN PEPTIDE IGA SER-ACNC: 8 UNITS (ref 0–19)
GLIADIN PEPTIDE IGG SER-ACNC: 2 UNITS (ref 0–19)
IGA SERPL-MCNC: 137 MG/DL (ref 64–422)
TTG IGA SER-ACNC: <2 U/ML (ref 0–3)
TTG IGG SER-ACNC: <2 U/ML (ref 0–5)

## 2021-06-18 NOTE — ANESTHESIA POSTPROCEDURE EVALUATION
"Patient: Viry Randall    Procedure Summary     Date: 06/17/21 Room / Location:  [facility]    Anesthesia Start: 1506 Anesthesia Stop: 1544    Procedures:       ESOPHAGOGASTRODUODENOSCOPY WITH COLD BX'S (N/A Esophagus)      COLONOSCOPY TO CECUM (N/A ) Diagnosis:       Anemia, unspecified type      Polyp of colon, unspecified part of colon, unspecified type      (Anemia, unspecified type [D64.9])      (Polyp of colon, unspecified part of colon, unspecified type [K63.5])    Surgeons: Jamil Maciel MD Provider: Lindsey Monsivais MD    Anesthesia Type: MAC ASA Status: 2          Anesthesia Type: MAC    Vitals  Vitals Value Taken Time   /87 06/17/21 1603   Temp     Pulse 74 06/17/21 1603   Resp 17 06/17/21 1603   SpO2 100 % 06/17/21 1603           Post Anesthesia Care and Evaluation      Comments: Patient discharged before being evaluated by an Anesthesiologist. No apparent complications per the record.  This case was not medically directed. I am completing this chart for medical records purposes; I personally have no medical involvement with this patient.    /87 (BP Location: Left arm, Patient Position: Sitting)   Pulse 74   Resp 17   Ht 162.6 cm (64\")   Wt 50.8 kg (112 lb)   SpO2 100%   BMI 19.22 kg/m²           "

## 2021-07-06 ENCOUNTER — TELEPHONE (OUTPATIENT)
Dept: GASTROENTEROLOGY | Facility: CLINIC | Age: 76
End: 2021-07-06

## 2021-07-06 NOTE — TELEPHONE ENCOUNTER
----- Message from Jamil Maciel MD sent at 7/5/2021  6:15 PM EDT -----  07/05/21  Tell her that her labs show a normal TSH, CMP, CBC, and Mg level.       Path from the EGD shows mild stomach inflammation but no evidence of helicobacter pylori. She does still have short segment Anguiano's esophagus without dysplasia (which she knew already that she has). FAX to her PCP.   Ashley swanson

## 2021-07-06 NOTE — TELEPHONE ENCOUNTER
Call to pt.  Advise per Dr Maciel note.  Verb understanding.     Labs/path faxed via epic to Dr Brandon Larose.

## 2021-07-07 ENCOUNTER — TELEPHONE (OUTPATIENT)
Dept: GASTROENTEROLOGY | Facility: CLINIC | Age: 76
End: 2021-07-07

## 2021-07-07 ENCOUNTER — OFFICE VISIT (OUTPATIENT)
Dept: GASTROENTEROLOGY | Facility: CLINIC | Age: 76
End: 2021-07-07

## 2021-07-07 VITALS
SYSTOLIC BLOOD PRESSURE: 130 MMHG | HEIGHT: 64 IN | BODY MASS INDEX: 20.01 KG/M2 | DIASTOLIC BLOOD PRESSURE: 90 MMHG | WEIGHT: 117.2 LBS

## 2021-07-07 DIAGNOSIS — R10.10 PAIN OF UPPER ABDOMEN: ICD-10-CM

## 2021-07-07 DIAGNOSIS — R10.13 EPIGASTRIC PAIN: ICD-10-CM

## 2021-07-07 DIAGNOSIS — K63.5 POLYP OF COLON, UNSPECIFIED PART OF COLON, UNSPECIFIED TYPE: ICD-10-CM

## 2021-07-07 DIAGNOSIS — R07.89 OTHER CHEST PAIN: Primary | ICD-10-CM

## 2021-07-07 DIAGNOSIS — K22.70 BARRETT'S ESOPHAGUS WITHOUT DYSPLASIA: ICD-10-CM

## 2021-07-07 PROCEDURE — 99214 OFFICE O/P EST MOD 30 MIN: CPT | Performed by: INTERNAL MEDICINE
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
## 2021-07-07 NOTE — TELEPHONE ENCOUNTER
----- Message from Jamil Maciel MD sent at 7/7/2021  8:18 AM EDT -----  Please send to Dr. Viktor Larose reports of the 1/21 MRI of the brain, CXR, and thoracic spine xrays.

## 2021-08-06 ENCOUNTER — HOSPITAL ENCOUNTER (OUTPATIENT)
Dept: CT IMAGING | Facility: HOSPITAL | Age: 76
Discharge: HOME | End: 2021-08-06
Admitting: INTERNAL MEDICINE

## 2021-08-06 DIAGNOSIS — R07.89 OTHER CHEST PAIN: ICD-10-CM

## 2021-08-06 DIAGNOSIS — R10.13 EPIGASTRIC PAIN: ICD-10-CM

## 2021-08-06 LAB — CREAT BLDA-MCNC: 0.8 MG/DL (ref 0.6–1.3)

## 2021-08-06 PROCEDURE — 74177 CT ABD & PELVIS W/CONTRAST: CPT

## 2021-08-06 PROCEDURE — 82565 ASSAY OF CREATININE: CPT

## 2021-08-06 PROCEDURE — 0 DIATRIZOATE MEGLUMINE & SODIUM PER 1 ML: Performed by: INTERNAL MEDICINE

## 2021-08-06 PROCEDURE — 71260 CT THORAX DX C+: CPT

## 2021-08-06 RX ADMIN — DIATRIZOATE MEGLUMINE AND DIATRIZOATE SODIUM 30 ML: 600; 100 SOLUTION ORAL; RECTAL at 07:10

## 2021-08-08 NOTE — PROGRESS NOTES
"08/08/21       I called her with the results of this CT chest, abd, and pelvis. I told her to f/u with Dr. Riddle (her OB-GYN) to make sure that Dr. Riddle is OK with the \"uterine leiomyomas\" and the 1.5 x 1.1 cm diameter opacity in the axillary tail of the right breast?       Please FAX a copy of this to Dr. Riddle and to Dr. Viktor Larose.  Thx. kj"

## 2021-08-26 ENCOUNTER — TELEPHONE (OUTPATIENT)
Dept: GASTROENTEROLOGY | Facility: CLINIC | Age: 76
End: 2021-08-26

## 2021-08-26 NOTE — TELEPHONE ENCOUNTER
"----- Message from Jamil Maciel MD sent at 8/8/2021  3:57 PM EDT -----  08/08/21       I called her with the results of this CT chest, abd, and pelvis. I told her to f/u with Dr. Riddle (her OB-GYN) to make sure that Dr. Riddle is OK with the \"uterine leiomyomas\" and the 1.5 x 1.1 cm diameter opacity in the axillary tail of the right breast?       Please FAX a copy of this to Dr. Riddle and to Dr. Viktor Larose.  x. [facility]  "

## 2021-09-20 ENCOUNTER — OFFICE VISIT (OUTPATIENT)
Dept: OBSTETRICS AND GYNECOLOGY | Age: 76
End: 2021-09-20

## 2021-09-20 VITALS
DIASTOLIC BLOOD PRESSURE: 82 MMHG | BODY MASS INDEX: 19.29 KG/M2 | HEIGHT: 64 IN | SYSTOLIC BLOOD PRESSURE: 130 MMHG | WEIGHT: 113 LBS

## 2021-09-20 DIAGNOSIS — D25.2 SUBSEROUS LEIOMYOMA OF UTERUS: Primary | ICD-10-CM

## 2021-09-20 PROCEDURE — 99212 OFFICE O/P EST SF 10 MIN: CPT | Performed by: OBSTETRICS & GYNECOLOGY

## 2021-09-20 NOTE — PROGRESS NOTES
Chief Complaint   Patient presents with   • Abdominal Pain     upper middle abdominal pain, and fatigue, had ultrasound     Viry Randall complains of epigastric pain.  She has seen gastroenterology and has had CT scan, endoscopy.  The source has not been found quite yet.  She has no vaginal bleeding, no pelvic pain.  She is noted to have uterine fibroids on recent CT scan.  She has a known history of uterine fibroids    Appears well and in no distress    A/P  Epigastric pain  Uterine fibroids    On ultrasound, uterine fibroids appear stable from prior ultrasounds in 2018, 2017    There is not seem to be a gynecologic cause of her epigastric pain.    Follow-up for annual exam    Corinne Riddle MD  9/20/2021  17:30 EDT

## 2021-10-27 ENCOUNTER — OFFICE VISIT (OUTPATIENT)
Dept: GASTROENTEROLOGY | Facility: CLINIC | Age: 76
End: 2021-10-27

## 2021-10-27 VITALS
HEART RATE: 66 BPM | DIASTOLIC BLOOD PRESSURE: 97 MMHG | OXYGEN SATURATION: 98 % | BODY MASS INDEX: 21.79 KG/M2 | WEIGHT: 118.4 LBS | TEMPERATURE: 96.4 F | SYSTOLIC BLOOD PRESSURE: 162 MMHG | HEIGHT: 62 IN

## 2021-10-27 DIAGNOSIS — D50.9 IRON DEFICIENCY ANEMIA, UNSPECIFIED IRON DEFICIENCY ANEMIA TYPE: ICD-10-CM

## 2021-10-27 DIAGNOSIS — R10.13 EPIGASTRIC PAIN: Primary | ICD-10-CM

## 2021-10-27 DIAGNOSIS — K22.70 BARRETT'S ESOPHAGUS WITHOUT DYSPLASIA: ICD-10-CM

## 2021-10-27 DIAGNOSIS — K63.5 POLYP OF COLON, UNSPECIFIED PART OF COLON, UNSPECIFIED TYPE: ICD-10-CM

## 2021-10-27 DIAGNOSIS — K21.9 GASTROESOPHAGEAL REFLUX DISEASE, UNSPECIFIED WHETHER ESOPHAGITIS PRESENT: ICD-10-CM

## 2021-10-27 PROCEDURE — 99214 OFFICE O/P EST MOD 30 MIN: CPT | Performed by: INTERNAL MEDICINE

## 2021-10-27 RX ORDER — LANSOPRAZOLE 30 MG/1
30 CAPSULE, DELAYED RELEASE ORAL
Qty: 180 CAPSULE | Refills: 3 | Status: SHIPPED | OUTPATIENT
Start: 2021-10-27 | End: 2022-03-30 | Stop reason: ALTCHOICE
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
## 2021-11-08 ENCOUNTER — TELEPHONE (OUTPATIENT)
Dept: GASTROENTEROLOGY | Facility: CLINIC | Age: 76
End: 2021-11-08

## 2021-11-08 NOTE — TELEPHONE ENCOUNTER
Overdue alert received for US GB.     Call to pt.  Advise may contact [employer] to arrange.  States will await call because knows that things very busy.

## 2022-01-31 ENCOUNTER — HOSPITAL ENCOUNTER (OUTPATIENT)
Dept: ULTRASOUND IMAGING | Facility: HOSPITAL | Age: 77
Discharge: HOME | End: 2022-01-31
Admitting: INTERNAL MEDICINE

## 2022-01-31 DIAGNOSIS — R10.13 EPIGASTRIC PAIN: ICD-10-CM

## 2022-01-31 PROCEDURE — 76705 ECHO EXAM OF ABDOMEN: CPT

## 2022-02-03 NOTE — PROGRESS NOTES
02/03/22       Tell her that her ultrasound of the gallbladder shows multiple tiny gallbladder polyps. The bile ducts are not dilated which is good. There may be some fatty infiltration of the liver. We can discuss in more detail when she is seen in follow-up in the office.       Please send a copy of this report to her PCP.  Ashley swanson

## 2022-02-04 ENCOUNTER — TELEPHONE (OUTPATIENT)
Dept: GASTROENTEROLOGY | Facility: CLINIC | Age: 77
End: 2022-02-04

## 2022-02-04 NOTE — TELEPHONE ENCOUNTER
Called pt and advised of Dr Maciel's note. Verb understanding.      Results sent to Dr Larose thru [facility].

## 2022-02-04 NOTE — TELEPHONE ENCOUNTER
----- Message from Jamil Maciel MD sent at 2/3/2022 10:10 AM EST -----  02/03/22       Tell her that her ultrasound of the gallbladder shows multiple tiny gallbladder polyps. The bile ducts are not dilated which is good. There may be some fatty infiltration of the liver. We can discuss in more detail when she is seen in follow-up in the office.       Please send a copy of this report to her PCP.  Ashley kjwilson

## 2022-03-30 ENCOUNTER — OFFICE VISIT (OUTPATIENT)
Dept: GASTROENTEROLOGY | Facility: CLINIC | Age: 77
End: 2022-03-30

## 2022-03-30 VITALS
BODY MASS INDEX: 21.12 KG/M2 | DIASTOLIC BLOOD PRESSURE: 86 MMHG | WEIGHT: 114.8 LBS | HEART RATE: 72 BPM | TEMPERATURE: 97.1 F | OXYGEN SATURATION: 96 % | SYSTOLIC BLOOD PRESSURE: 149 MMHG | HEIGHT: 62 IN

## 2022-03-30 DIAGNOSIS — K22.70 BARRETT'S ESOPHAGUS WITHOUT DYSPLASIA: ICD-10-CM

## 2022-03-30 DIAGNOSIS — K63.5 POLYP OF COLON, UNSPECIFIED PART OF COLON, UNSPECIFIED TYPE: ICD-10-CM

## 2022-03-30 DIAGNOSIS — R10.13 EPIGASTRIC PAIN: Primary | ICD-10-CM

## 2022-03-30 DIAGNOSIS — K21.9 GASTROESOPHAGEAL REFLUX DISEASE, UNSPECIFIED WHETHER ESOPHAGITIS PRESENT: ICD-10-CM

## 2022-03-30 DIAGNOSIS — D50.9 IRON DEFICIENCY ANEMIA, UNSPECIFIED IRON DEFICIENCY ANEMIA TYPE: ICD-10-CM

## 2022-03-30 PROCEDURE — 99214 OFFICE O/P EST MOD 30 MIN: CPT | Performed by: INTERNAL MEDICINE

## 2022-03-30 RX ORDER — PANTOPRAZOLE SODIUM 20 MG/1
40 TABLET, DELAYED RELEASE ORAL DAILY
Qty: 90 TABLET | Refills: 3 | Status: SHIPPED | OUTPATIENT
Start: 2022-03-30 | End: 2022-04-05 | Stop reason: SDUPTHER
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
## 2022-04-05 ENCOUNTER — TELEPHONE (OUTPATIENT)
Dept: GASTROENTEROLOGY | Facility: CLINIC | Age: 77
End: 2022-04-05

## 2022-04-05 DIAGNOSIS — K22.70 BARRETT'S ESOPHAGUS WITHOUT DYSPLASIA: ICD-10-CM

## 2022-04-05 DIAGNOSIS — R10.13 EPIGASTRIC PAIN: ICD-10-CM

## 2022-04-05 RX ORDER — PANTOPRAZOLE SODIUM 20 MG/1
40 TABLET, DELAYED RELEASE ORAL DAILY
Qty: 90 TABLET | Refills: 3 | Status: SHIPPED | OUTPATIENT
Start: 2022-04-05 | End: 2022-07-04

## 2022-04-05 NOTE — TELEPHONE ENCOUNTER
----- Message from Brooke Long sent at 4/5/2022  9:33 AM EDT -----  Regarding: pantoprazole (PROTONIX) 20 MG EC tablet  Contact: [phone]  Pt is calling and says that is currently not living at home right now and would needs this medcation sent else were.                        [id number] - [place], [place] - [address] - [phone]  - [phone] FX   Phone:  [phone]  Fax:  [phone]

## 2022-04-05 NOTE — TELEPHONE ENCOUNTER
Called pt and pt reports that she is taking care of her ill brother and would like her script sent to [facility]. Advised pt we will send her pantoprazole to [place].    Verb understanding and script sent thru [facility].

## 2022-04-08 ENCOUNTER — TELEPHONE (OUTPATIENT)
Dept: GASTROENTEROLOGY | Facility: CLINIC | Age: 77
End: 2022-04-08

## 2022-04-08 NOTE — TELEPHONE ENCOUNTER
"Call received from Pharmacist at [facility] requestig clarification on pantoprazole 20 mg amount - rx states \"for 90 days\".  Advise 2 tabs/day -  (2nd Anguiano's).   "

## 2022-05-04 ENCOUNTER — TELEPHONE (OUTPATIENT)
Dept: GASTROENTEROLOGY | Facility: CLINIC | Age: 77
End: 2022-05-04

## 2022-05-04 RX ORDER — LANSOPRAZOLE 30 MG/1
30 CAPSULE, DELAYED RELEASE ORAL 2 TIMES DAILY
Qty: 60 CAPSULE | Refills: 5 | Status: SHIPPED | OUTPATIENT
Start: 2022-05-04

## 2022-05-04 NOTE — TELEPHONE ENCOUNTER
Pt is taking protonix and says she is having severe acid reflux and acid in mouth - she would like to go back to what she was previously on - please advise

## 2022-06-08 ENCOUNTER — OFFICE VISIT (OUTPATIENT)
Dept: GASTROENTEROLOGY | Facility: CLINIC | Age: 77
End: 2022-06-08

## 2022-06-08 VITALS
TEMPERATURE: 96.1 F | SYSTOLIC BLOOD PRESSURE: 156 MMHG | HEIGHT: 61 IN | HEART RATE: 71 BPM | WEIGHT: 112.8 LBS | DIASTOLIC BLOOD PRESSURE: 81 MMHG | BODY MASS INDEX: 21.3 KG/M2

## 2022-06-08 DIAGNOSIS — K22.70 BARRETT'S ESOPHAGUS WITHOUT DYSPLASIA: Primary | ICD-10-CM

## 2022-06-08 DIAGNOSIS — K63.5 POLYP OF COLON, UNSPECIFIED PART OF COLON, UNSPECIFIED TYPE: ICD-10-CM

## 2022-06-08 DIAGNOSIS — K21.9 GASTROESOPHAGEAL REFLUX DISEASE, UNSPECIFIED WHETHER ESOPHAGITIS PRESENT: ICD-10-CM

## 2022-06-08 PROCEDURE — 99213 OFFICE O/P EST LOW 20 MIN: CPT | Performed by: INTERNAL MEDICINE
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
## 2022-06-27 ENCOUNTER — TELEPHONE (OUTPATIENT)
Dept: GASTROENTEROLOGY | Facility: CLINIC | Age: 77
End: 2022-06-27

## 2022-06-27 NOTE — TELEPHONE ENCOUNTER
Returned pts call    She said for the last 2 days she has had a lot of breakthrough GERD.  She is experiencing pain centralized just below the breast bone.  She said she didn't eat anything out of the ordinary and is not sure why it has been so bad lately.    She is taking the lansoprazole 30mg daily per her med list she can take BID, but she is only doing daily at this time.  Is it ok for her to increase to BID? What would you recommend for the breakthrough pain, (Tums didn't help)?

## 2022-06-27 NOTE — TELEPHONE ENCOUNTER
Caller: Viry Randall    Relationship: Self    Best call back number: [phone]    What is the best time to reach you: ANYTIME     Who are you requesting to speak with (clinical staff, provider,  specific staff member): CLINICAL STAFF      What was the call regarding: PATIENT HAS QUESTIONS REGARDING HER MEDICATION. WOULD LIKE TO SPEAK WITH SOMEONE IN OFFICE.    Do you require a callback: YES

## 2022-06-28 ENCOUNTER — TELEPHONE (OUTPATIENT)
Dept: GASTROENTEROLOGY | Facility: CLINIC | Age: 77
End: 2022-06-28

## 2022-06-28 NOTE — TELEPHONE ENCOUNTER
6/28/22 - I called her back yesterday and again today but could only leave a message. I called because she said that she wanted me to call her back. kiki

## 2022-06-30 ENCOUNTER — TELEPHONE (OUTPATIENT)
Dept: GASTROENTEROLOGY | Facility: CLINIC | Age: 77
End: 2022-06-30

## 2022-06-30 DIAGNOSIS — R10.10 UPPER ABDOMINAL PAIN: Primary | ICD-10-CM

## 2022-06-30 DIAGNOSIS — R63.4 WEIGHT LOSS: ICD-10-CM

## 2022-06-30 NOTE — TELEPHONE ENCOUNTER
She c/o burning in the epigastric area and to the periumbilical area. She doesn't know what brings this on. No change with eating or exercise. The epigastric discomfort is constant. Sometimes nausea but no vomiting. No fevers, chills. No constipation or diarrhea. No melena or rectal bleeding. No NSAIDs. On Prevacid 30 mg/day. No dysphagia. She is losing weight.   Assessment:  1. Upper abdominal discomfort.  2. Weight loss.    Plan:  1. CT abd/pelvis with pancreatic protocol.  kiki

## 2022-07-26 ENCOUNTER — HOSPITAL ENCOUNTER (OUTPATIENT)
Dept: CT IMAGING | Facility: HOSPITAL | Age: 77
Discharge: HOME | End: 2022-07-26
Admitting: INTERNAL MEDICINE

## 2022-07-26 DIAGNOSIS — R10.10 UPPER ABDOMINAL PAIN: ICD-10-CM

## 2022-07-26 DIAGNOSIS — R63.4 WEIGHT LOSS: ICD-10-CM

## 2022-07-26 PROCEDURE — 25010000002 IOPAMIDOL 61 % SOLUTION: Performed by: INTERNAL MEDICINE

## 2022-07-26 PROCEDURE — 82565 ASSAY OF CREATININE: CPT

## 2022-07-26 PROCEDURE — 74177 CT ABD & PELVIS W/CONTRAST: CPT

## 2022-07-26 PROCEDURE — 0 DIATRIZOATE MEGLUMINE & SODIUM PER 1 ML: Performed by: INTERNAL MEDICINE

## 2022-07-26 RX ADMIN — IOPAMIDOL 85 ML: 612 INJECTION, SOLUTION INTRAVENOUS at 14:32

## 2022-07-26 RX ADMIN — DIATRIZOATE MEGLUMINE AND DIATRIZOATE SODIUM 30 ML: 660; 100 LIQUID ORAL; RECTAL at 14:32

## 2022-07-27 ENCOUNTER — TELEPHONE (OUTPATIENT)
Dept: GASTROENTEROLOGY | Facility: CLINIC | Age: 77
End: 2022-07-27

## 2022-07-27 NOTE — TELEPHONE ENCOUNTER
Patient had MRI yesterday and she is stating that she is feeling discomfort in the epigastric area and she is wanting to know if there was something that she could take to relieve it. Please call [phone].

## 2022-07-27 NOTE — TELEPHONE ENCOUNTER
"Call to pt.  Reports completed CT yesterday at [facility].  Reports aching \"right below my breast bone\".  Ranks 7 on pain scale.  Constant.  Somewhat nauseated and SOA.    Advise will update DR Maciel.  In the meantime, rest quietly.  If symptoms do not resolve - go to ER for eval of possible cardiac issue.  Verb understanding.   "

## 2022-07-29 ENCOUNTER — TELEPHONE (OUTPATIENT)
Dept: GASTROENTEROLOGY | Facility: CLINIC | Age: 77
End: 2022-07-29

## 2022-07-29 DIAGNOSIS — R10.13 EPIGASTRIC PAIN: Primary | ICD-10-CM

## 2022-07-29 NOTE — TELEPHONE ENCOUNTER
7/29/22 - Wednesday (2 days ago) she felt terrible: headache, freezing cold on the day she had the CT abd/pelvis. She is better now. Still has the epigastric aching. She has lost 2-3 pounds. No nausea or vomting. No fevers, chills. No diarrhea or constipation. No rectal bleeding or melena. Denies NSAIDs use. No dysphagia. We went over her last EGD and colonsocopy done in 6/21.   Assessment:  1. Epigastric pain  2. Abnormal CT abd/pelvis with abnormality in distal esophagus/cardia of stomach of unknown etiology.     Plan:  1). Have her come by the office for the following labs:  - CBC, CMP, amylase, lipase, troponin (epigastric pain), and  2. Schedule her for an EGD to be done by me in the next 4 weeks.     SA/MT - please schedule her to have the above labs done in our office soon.     CG - please schedule her to have an EGD to be done by me in the next 4 weeks. [email or web address]

## 2022-08-03 ENCOUNTER — LAB (OUTPATIENT)
Dept: GASTROENTEROLOGY | Facility: CLINIC | Age: 77
End: 2022-08-03

## 2022-08-04 LAB
ALBUMIN SERPL-MCNC: 4.3 G/DL (ref 3.7–4.7)
ALBUMIN/GLOB SERPL: 2 {RATIO} (ref 1.2–2.2)
ALP SERPL-CCNC: 93 IU/L (ref 44–121)
ALT SERPL-CCNC: 9 IU/L (ref 0–32)
AMYLASE SERPL-CCNC: 98 U/L (ref 31–110)
AST SERPL-CCNC: 19 IU/L (ref 0–40)
BASOPHILS # BLD AUTO: 0 X10E3/UL (ref 0–0.2)
BASOPHILS NFR BLD AUTO: 1 %
BILIRUB SERPL-MCNC: 0.2 MG/DL (ref 0–1.2)
BUN SERPL-MCNC: 16 MG/DL (ref 8–27)
BUN/CREAT SERPL: 21 (ref 12–28)
CALCIUM SERPL-MCNC: 9.4 MG/DL (ref 8.7–10.3)
CHLORIDE SERPL-SCNC: 103 MMOL/L (ref 96–106)
CO2 SERPL-SCNC: 21 MMOL/L (ref 20–29)
CREAT SERPL-MCNC: 0.78 MG/DL (ref 0.57–1)
EGFRCR SERPLBLD CKD-EPI 2021: 78 ML/MIN/1.73
EOSINOPHIL # BLD AUTO: 0.1 X10E3/UL (ref 0–0.4)
EOSINOPHIL NFR BLD AUTO: 2 %
ERYTHROCYTE [DISTWIDTH] IN BLOOD BY AUTOMATED COUNT: 14.7 % (ref 11.7–15.4)
GLOBULIN SER CALC-MCNC: 2.1 G/DL (ref 1.5–4.5)
GLUCOSE SERPL-MCNC: NORMAL MG/DL
HCT VFR BLD AUTO: 42.1 % (ref 34–46.6)
HGB BLD-MCNC: 13.9 G/DL (ref 11.1–15.9)
IMM GRANULOCYTES # BLD AUTO: 0 X10E3/UL (ref 0–0.1)
IMM GRANULOCYTES NFR BLD AUTO: 0 %
LIPASE SERPL-CCNC: 31 U/L (ref 14–85)
LYMPHOCYTES # BLD AUTO: 2 X10E3/UL (ref 0.7–3.1)
LYMPHOCYTES NFR BLD AUTO: 35 %
MCH RBC QN AUTO: 29.6 PG (ref 26.6–33)
MCHC RBC AUTO-ENTMCNC: 33 G/DL (ref 31.5–35.7)
MCV RBC AUTO: 90 FL (ref 79–97)
MONOCYTES # BLD AUTO: 0.4 X10E3/UL (ref 0.1–0.9)
MONOCYTES NFR BLD AUTO: 8 %
NEUTROPHILS # BLD AUTO: 3.2 X10E3/UL (ref 1.4–7)
NEUTROPHILS NFR BLD AUTO: 54 %
PLATELET # BLD AUTO: 287 X10E3/UL (ref 150–450)
POTASSIUM SERPL-SCNC: NORMAL MMOL/L
PROT SERPL-MCNC: 6.4 G/DL (ref 6–8.5)
RBC # BLD AUTO: 4.7 X10E6/UL (ref 3.77–5.28)
SODIUM SERPL-SCNC: 142 MMOL/L (ref 134–144)
TROPONIN T SERPL HS-MCNC: <6 NG/L (ref 0–14)
WBC # BLD AUTO: 5.8 X10E3/UL (ref 3.4–10.8)

## 2022-08-22 ENCOUNTER — TELEPHONE (OUTPATIENT)
Dept: GASTROENTEROLOGY | Facility: CLINIC | Age: 77
End: 2022-08-22

## 2022-08-22 NOTE — TELEPHONE ENCOUNTER
----- Message from Jamil Maciel MD sent at 8/6/2022  4:52 PM EDT -----  08/06/22       Tell her that her labs looked good. We will see what the EGD shows? Has the EGD been scheduled yet to be done in the next 4 weeks?       Send a copy of this report to her PCP.   Thx. kjh   As certified below, I, or a nurse practitioner or physician assistant working with me, had a face-to-face encounter that meets the physician face-to-face encounter requirements.

## 2022-08-22 NOTE — TELEPHONE ENCOUNTER
Call to pt.  Advise per Dr Maciel note. Verb understanding.      Message to Scheduling.     Labs faxed via epic to Dr Brandon Larose.

## 2022-08-24 ENCOUNTER — PROCEDURE VISIT (OUTPATIENT)
Dept: OBSTETRICS AND GYNECOLOGY | Age: 77
End: 2022-08-24

## 2022-08-24 ENCOUNTER — OFFICE VISIT (OUTPATIENT)
Dept: OBSTETRICS AND GYNECOLOGY | Age: 77
End: 2022-08-24

## 2022-08-24 ENCOUNTER — APPOINTMENT (OUTPATIENT)
Dept: WOMENS IMAGING | Facility: HOSPITAL | Age: 77
End: 2022-08-24

## 2022-08-24 VITALS
DIASTOLIC BLOOD PRESSURE: 82 MMHG | BODY MASS INDEX: 20.47 KG/M2 | HEIGHT: 61 IN | SYSTOLIC BLOOD PRESSURE: 134 MMHG | WEIGHT: 108.4 LBS

## 2022-08-24 DIAGNOSIS — M85.80 OSTEOPENIA AFTER MENOPAUSE: ICD-10-CM

## 2022-08-24 DIAGNOSIS — Z78.0 OSTEOPENIA AFTER MENOPAUSE: ICD-10-CM

## 2022-08-24 DIAGNOSIS — Z01.419 ENCOUNTER FOR GYNECOLOGICAL EXAMINATION WITHOUT ABNORMAL FINDING: Primary | ICD-10-CM

## 2022-08-24 DIAGNOSIS — Z12.31 VISIT FOR SCREENING MAMMOGRAM: Primary | ICD-10-CM

## 2022-08-24 DIAGNOSIS — L90.0 LICHEN SCLEROSUS ET ATROPHICUS: ICD-10-CM

## 2022-08-24 PROCEDURE — G0101 CA SCREEN;PELVIC/BREAST EXAM: HCPCS | Performed by: OBSTETRICS & GYNECOLOGY

## 2022-08-24 PROCEDURE — 77063 BREAST TOMOSYNTHESIS BI: CPT | Performed by: RADIOLOGY

## 2022-08-24 PROCEDURE — 77063 BREAST TOMOSYNTHESIS BI: CPT | Performed by: OBSTETRICS & GYNECOLOGY

## 2022-08-24 PROCEDURE — 77067 SCR MAMMO BI INCL CAD: CPT | Performed by: OBSTETRICS & GYNECOLOGY

## 2022-08-24 PROCEDURE — 77067 SCR MAMMO BI INCL CAD: CPT | Performed by: RADIOLOGY

## 2022-08-24 RX ORDER — RALOXIFENE HYDROCHLORIDE 60 MG/1
60 TABLET, FILM COATED ORAL DAILY
Qty: 90 TABLET | Refills: 3 | Status: SHIPPED | OUTPATIENT
Start: 2022-08-24
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
## 2022-09-06 ENCOUNTER — TELEPHONE (OUTPATIENT)
Dept: GASTROENTEROLOGY | Facility: CLINIC | Age: 77
End: 2022-09-06

## 2022-09-06 NOTE — TELEPHONE ENCOUNTER
Hub staff attempted to follow warm transfer process and was unsuccessful     Caller: Viry Randall    Relationship to patient: Self    Best call back number: [phone]    Patient is needing: PT CALLED IN REGARDS TO HER PROCEDURE. IF SHE TAKES A CAB OR DRIVES TO THE APPT IS IT OK AS LONG AS SHE HAS A RIDE HOME AFTER ANESTHESIA? SHE HAS A RIDE HOME BUT HAVING A HARD TIME FINDING A RIDE THERE AT 5:30 AM

## 2022-09-09 ENCOUNTER — HOSPITAL ENCOUNTER (OUTPATIENT)
Facility: HOSPITAL | Age: 77
Setting detail: HOSPITAL OUTPATIENT SURGERY
Discharge: HOME | End: 2022-09-09
Attending: INTERNAL MEDICINE | Admitting: INTERNAL MEDICINE

## 2022-09-09 ENCOUNTER — ANESTHESIA EVENT (OUTPATIENT)
Dept: GASTROENTEROLOGY | Facility: HOSPITAL | Age: 77
End: 2022-09-09

## 2022-09-09 ENCOUNTER — ANESTHESIA (OUTPATIENT)
Dept: GASTROENTEROLOGY | Facility: HOSPITAL | Age: 77
End: 2022-09-09

## 2022-09-09 VITALS
WEIGHT: 107 LBS | RESPIRATION RATE: 16 BRPM | HEIGHT: 61 IN | TEMPERATURE: 98 F | OXYGEN SATURATION: 99 % | DIASTOLIC BLOOD PRESSURE: 98 MMHG | HEART RATE: 65 BPM | BODY MASS INDEX: 20.2 KG/M2 | SYSTOLIC BLOOD PRESSURE: 156 MMHG

## 2022-09-09 DIAGNOSIS — R10.10 PAIN OF UPPER ABDOMEN: Primary | ICD-10-CM

## 2022-09-09 DIAGNOSIS — R10.13 EPIGASTRIC PAIN: ICD-10-CM

## 2022-09-09 PROCEDURE — 88305 TISSUE EXAM BY PATHOLOGIST: CPT | Performed by: INTERNAL MEDICINE

## 2022-09-09 PROCEDURE — 43239 EGD BIOPSY SINGLE/MULTIPLE: CPT | Performed by: INTERNAL MEDICINE

## 2022-09-09 PROCEDURE — 25010000002 PROPOFOL 10 MG/ML EMULSION: Performed by: ANESTHESIOLOGY

## 2022-09-09 RX ORDER — LIDOCAINE HYDROCHLORIDE 20 MG/ML
INJECTION, SOLUTION INFILTRATION; PERINEURAL AS NEEDED
Status: DISCONTINUED | OUTPATIENT
Start: 2022-09-09 | End: 2022-09-09 | Stop reason: SURG

## 2022-09-09 RX ORDER — PROPOFOL 10 MG/ML
VIAL (ML) INTRAVENOUS CONTINUOUS PRN
Status: DISCONTINUED | OUTPATIENT
Start: 2022-09-09 | End: 2022-09-09 | Stop reason: SURG

## 2022-09-09 RX ORDER — PROPOFOL 10 MG/ML
VIAL (ML) INTRAVENOUS AS NEEDED
Status: DISCONTINUED | OUTPATIENT
Start: 2022-09-09 | End: 2022-09-09 | Stop reason: SURG

## 2022-09-09 RX ORDER — SODIUM CHLORIDE, SODIUM LACTATE, POTASSIUM CHLORIDE, CALCIUM CHLORIDE 600; 310; 30; 20 MG/100ML; MG/100ML; MG/100ML; MG/100ML
30 INJECTION, SOLUTION INTRAVENOUS CONTINUOUS PRN
Status: DISCONTINUED | OUTPATIENT
Start: 2022-09-09 | End: 2022-09-09 | Stop reason: HOSPADM

## 2022-09-09 RX ORDER — SODIUM CHLORIDE, SODIUM LACTATE, POTASSIUM CHLORIDE, CALCIUM CHLORIDE 600; 310; 30; 20 MG/100ML; MG/100ML; MG/100ML; MG/100ML
INJECTION, SOLUTION INTRAVENOUS CONTINUOUS PRN
Status: DISCONTINUED | OUTPATIENT
Start: 2022-09-09 | End: 2022-09-09 | Stop reason: SURG

## 2022-09-09 RX ADMIN — Medication 160 MCG/KG/MIN: at 07:07

## 2022-09-09 RX ADMIN — SODIUM CHLORIDE, POTASSIUM CHLORIDE, SODIUM LACTATE AND CALCIUM CHLORIDE: 600; 310; 30; 20 INJECTION, SOLUTION INTRAVENOUS at 07:03

## 2022-09-09 RX ADMIN — LIDOCAINE HYDROCHLORIDE 50 MG: 20 INJECTION, SOLUTION INFILTRATION; PERINEURAL at 07:04

## 2022-09-09 RX ADMIN — SODIUM CHLORIDE, POTASSIUM CHLORIDE, SODIUM LACTATE AND CALCIUM CHLORIDE 30 ML/HR: 600; 310; 30; 20 INJECTION, SOLUTION INTRAVENOUS at 06:47

## 2022-09-09 RX ADMIN — PROPOFOL 100 MG: 10 INJECTION, EMULSION INTRAVENOUS at 07:07

## 2022-09-09 NOTE — NURSING NOTE
Checking on pt regularly.  Pt still attempting to contact ride.  Per pt, ride was not expecting call until 10:00 and has phone turned off.  Attempted number as well and not taking calls per phone lline

## 2022-09-09 NOTE — ANESTHESIA POSTPROCEDURE EVALUATION
Patient: Viry Randall    Procedure Summary     Date: 09/09/22 Room / Location:  [facility]    Anesthesia Start: 0700 Anesthesia Stop: 0721    Procedure: ESOPHAGOGASTRODUODENOSCOPY with biopsy (N/A Esophagus) Diagnosis:       Epigastric pain      (Epigastric pain [R10.13])    Surgeons: Jamil Maciel MD Provider: You Todd MD    Anesthesia Type: MAC ASA Status: 2          Anesthesia Type: MAC    Vitals  Vitals Value Taken Time   /98 09/09/22 0740   Temp 36.7 °C (98 °F) 09/09/22 0720   Pulse 65 09/09/22 0740   Resp 16 09/09/22 0740   SpO2 99 % 09/09/22 0740           Post Anesthesia Care and Evaluation    Patient location during evaluation: PACU  Patient participation: complete - patient participated  Level of consciousness: awake  Pain management: satisfactory to patient    Airway patency: patent  Anesthetic complications: No anesthetic complications  PONV Status: controlled  Cardiovascular status: acceptable  Respiratory status: acceptable  Hydration status: acceptable
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
## 2022-09-09 NOTE — ANESTHESIA PREPROCEDURE EVALUATION
Anesthesia Evaluation     Patient summary reviewed   NPO Solid Status: > 6 hours  NPO Liquid Status: > 6 hours           Airway   Mallampati: II  TM distance: >3 FB  Neck ROM: full  Dental      Comment: Upper front cap loose      Pulmonary    (-) COPD, asthma, sleep apnea, not a smoker  Cardiovascular     ECG reviewed    (+) hypertension,   (-) CAD, dysrhythmias, angina, CHF    ROS comment: TTE 2020 normal    Neuro/Psych  (-) seizures, CVA  GI/Hepatic/Renal/Endo    (+)  GERD well controlled,    (-) liver disease, no renal disease, diabetes, no thyroid disorder    Musculoskeletal     Abdominal    Substance History      OB/GYN          Other                        Anesthesia Plan    ASA 2     MAC       Anesthetic plan, risks, benefits, and alternatives have been provided, discussed and informed consent has been obtained with: patient.        CODE STATUS:

## 2022-09-12 LAB
LAB AP CASE REPORT: NORMAL
PATH REPORT.FINAL DX SPEC: NORMAL
PATH REPORT.GROSS SPEC: NORMAL

## 2022-09-22 ENCOUNTER — HOSPITAL ENCOUNTER (OUTPATIENT)
Dept: NUCLEAR MEDICINE | Facility: HOSPITAL | Age: 77
Discharge: HOME | End: 2022-09-22

## 2022-09-22 DIAGNOSIS — R10.10 PAIN OF UPPER ABDOMEN: ICD-10-CM

## 2022-09-22 PROCEDURE — 78227 HEPATOBIL SYST IMAGE W/DRUG: CPT

## 2022-09-22 PROCEDURE — 25010000002 SINCALIDE PER 5 MCG: Performed by: INTERNAL MEDICINE

## 2022-09-22 PROCEDURE — 0 TECHNETIUM TC 99M MEBROFENIN KIT: Performed by: INTERNAL MEDICINE

## 2022-09-22 PROCEDURE — A9537 TC99M MEBROFENIN: HCPCS | Performed by: INTERNAL MEDICINE

## 2022-09-22 RX ORDER — KIT FOR THE PREPARATION OF TECHNETIUM TC 99M MEBROFENIN 45 MG/10ML
1 INJECTION, POWDER, LYOPHILIZED, FOR SOLUTION INTRAVENOUS
Status: COMPLETED | OUTPATIENT
Start: 2022-09-22 | End: 2022-09-22

## 2022-09-22 RX ADMIN — SODIUM CHLORIDE 1 MCG: 9 INJECTION, SOLUTION INTRAVENOUS at 08:03

## 2022-09-22 RX ADMIN — MEBROFENIN 1 DOSE: 45 INJECTION, POWDER, LYOPHILIZED, FOR SOLUTION INTRAVENOUS at 06:45

## 2022-09-25 NOTE — PROGRESS NOTES
09/25/22       Tell her that path from her recent EGD looked OK. Send a copy of this report to her PCP.   Ashley swanson

## 2022-09-25 NOTE — PROGRESS NOTES
09/25/22       Tell her that the Kinevac Hida scan came back normal with a gallbladder ejection fraction of 88% (>35% is considered normal). Please send a copy of this report to her PCP.   Ashley swanson

## 2022-10-04 LAB — CREAT BLDA-MCNC: 0.7 MG/DL (ref 0.6–1.3)

## 2022-10-14 ENCOUNTER — TELEPHONE (OUTPATIENT)
Dept: GASTROENTEROLOGY | Facility: CLINIC | Age: 77
End: 2022-10-14

## 2022-10-14 NOTE — TELEPHONE ENCOUNTER
Patient notified of results and recommendations and verbalized understanding      Result note routed to PCP via epic

## 2022-10-14 NOTE — TELEPHONE ENCOUNTER
----- Message from Jamil Maciel MD sent at 9/25/2022  2:06 PM EDT -----  09/25/22       Tell her that path from her recent EGD looked OK. Send a copy of this report to her PCP.   Ashley swanson

## 2022-10-27 ENCOUNTER — TELEPHONE (OUTPATIENT)
Dept: GASTROENTEROLOGY | Facility: CLINIC | Age: 77
End: 2022-10-27

## 2022-10-27 NOTE — TELEPHONE ENCOUNTER
----- Message from Jamil Maciel MD sent at 9/25/2022  2:08 PM EDT -----  09/25/22       Tell her that the Kinevac Hida scan came back normal with a gallbladder ejection fraction of 88% (>35% is considered normal). Please send a copy of this report to her PCP.   Randy. kjh

## 2022-10-27 NOTE — TELEPHONE ENCOUNTER
Patient notified of results and recommendations and verbalized understanding    Result note routed to PCP

## 2022-12-08 ENCOUNTER — OFFICE VISIT (OUTPATIENT)
Dept: GASTROENTEROLOGY | Facility: CLINIC | Age: 77
End: 2022-12-08

## 2022-12-08 VITALS
TEMPERATURE: 97.3 F | OXYGEN SATURATION: 97 % | DIASTOLIC BLOOD PRESSURE: 98 MMHG | BODY MASS INDEX: 21.79 KG/M2 | HEIGHT: 61 IN | SYSTOLIC BLOOD PRESSURE: 178 MMHG | HEART RATE: 69 BPM | WEIGHT: 115.4 LBS

## 2022-12-08 DIAGNOSIS — K63.5 POLYP OF COLON, UNSPECIFIED PART OF COLON, UNSPECIFIED TYPE: ICD-10-CM

## 2022-12-08 DIAGNOSIS — K22.70 BARRETT'S ESOPHAGUS WITHOUT DYSPLASIA: Primary | ICD-10-CM

## 2022-12-08 DIAGNOSIS — K21.9 GASTROESOPHAGEAL REFLUX DISEASE, UNSPECIFIED WHETHER ESOPHAGITIS PRESENT: ICD-10-CM

## 2022-12-08 PROCEDURE — 99212 OFFICE O/P EST SF 10 MIN: CPT | Performed by: INTERNAL MEDICINE
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
## 2023-09-11 ENCOUNTER — HOSPITAL ENCOUNTER (OUTPATIENT)
Facility: HOSPITAL | Age: 78
Discharge: HOME | End: 2023-09-11
Payer: MEDICARE

## 2023-09-11 ENCOUNTER — OFFICE VISIT (OUTPATIENT)
Dept: OBSTETRICS AND GYNECOLOGY | Age: 78
End: 2023-09-11
Payer: MEDICARE

## 2023-09-11 VITALS
BODY MASS INDEX: 21.49 KG/M2 | SYSTOLIC BLOOD PRESSURE: 126 MMHG | HEIGHT: 61 IN | DIASTOLIC BLOOD PRESSURE: 72 MMHG | WEIGHT: 113.8 LBS

## 2023-09-11 DIAGNOSIS — N95.2 VAGINAL ATROPHY: ICD-10-CM

## 2023-09-11 DIAGNOSIS — Z78.0 OSTEOPENIA AFTER MENOPAUSE: ICD-10-CM

## 2023-09-11 DIAGNOSIS — Z01.419 ENCOUNTER FOR GYNECOLOGICAL EXAMINATION WITHOUT ABNORMAL FINDING: Primary | ICD-10-CM

## 2023-09-11 DIAGNOSIS — M85.80 OSTEOPENIA AFTER MENOPAUSE: ICD-10-CM

## 2023-09-11 DIAGNOSIS — Z12.31 VISIT FOR SCREENING MAMMOGRAM: ICD-10-CM

## 2023-09-11 DIAGNOSIS — L90.0 LICHEN SCLEROSUS ET ATROPHICUS: ICD-10-CM

## 2023-09-11 PROCEDURE — 77080 DXA BONE DENSITY AXIAL: CPT

## 2023-09-11 PROCEDURE — 77067 SCR MAMMO BI INCL CAD: CPT

## 2023-09-11 PROCEDURE — 77063 BREAST TOMOSYNTHESIS BI: CPT

## 2023-09-11 RX ORDER — RALOXIFENE HYDROCHLORIDE 60 MG/1
60 TABLET, FILM COATED ORAL DAILY
Qty: 90 TABLET | Refills: 3 | Status: SHIPPED | OUTPATIENT
Start: 2023-09-11

## 2023-09-11 RX ORDER — CLOBETASOL PROPIONATE 0.5 MG/G
CREAM TOPICAL
COMMUNITY
Start: 2023-07-28
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
## 2023-09-13 NOTE — PROGRESS NOTES
Mammogram is benign/normal, plan to repeat next year    Corinne Riddle MD  9/13/2023  12:14 EDT  
with patient

## 2024-02-07 ENCOUNTER — OFFICE VISIT (OUTPATIENT)
Dept: GASTROENTEROLOGY | Facility: CLINIC | Age: 79
End: 2024-02-07
Payer: MEDICARE

## 2024-02-07 VITALS
DIASTOLIC BLOOD PRESSURE: 102 MMHG | HEART RATE: 72 BPM | HEIGHT: 61 IN | TEMPERATURE: 97.3 F | WEIGHT: 116.2 LBS | OXYGEN SATURATION: 98 % | BODY MASS INDEX: 21.94 KG/M2 | SYSTOLIC BLOOD PRESSURE: 151 MMHG

## 2024-02-07 DIAGNOSIS — K21.9 GASTROESOPHAGEAL REFLUX DISEASE, UNSPECIFIED WHETHER ESOPHAGITIS PRESENT: ICD-10-CM

## 2024-02-07 DIAGNOSIS — D12.6 ADENOMATOUS POLYP OF COLON, UNSPECIFIED PART OF COLON: ICD-10-CM

## 2024-02-07 DIAGNOSIS — K22.70 BARRETT'S ESOPHAGUS WITHOUT DYSPLASIA: Primary | ICD-10-CM

## 2024-02-07 PROCEDURE — 99213 OFFICE O/P EST LOW 20 MIN: CPT | Performed by: INTERNAL MEDICINE

## 2024-02-07 PROCEDURE — 1159F MED LIST DOCD IN RCRD: CPT | Performed by: INTERNAL MEDICINE

## 2024-02-07 PROCEDURE — 3077F SYST BP >= 140 MM HG: CPT | Performed by: INTERNAL MEDICINE

## 2024-02-07 PROCEDURE — 3080F DIAST BP >= 90 MM HG: CPT | Performed by: INTERNAL MEDICINE

## 2024-02-07 PROCEDURE — 1160F RVW MEDS BY RX/DR IN RCRD: CPT | Performed by: INTERNAL MEDICINE

## 2024-02-07 RX ORDER — LANSOPRAZOLE 30 MG/1
30 CAPSULE, DELAYED RELEASE ORAL DAILY
Qty: 90 CAPSULE | Refills: 3 | Status: SHIPPED | OUTPATIENT
Start: 2024-02-07
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
## 2024-02-08 ENCOUNTER — TELEPHONE (OUTPATIENT)
Dept: GASTROENTEROLOGY | Facility: CLINIC | Age: 79
End: 2024-02-08
Payer: MEDICARE

## 2024-02-08 NOTE — TELEPHONE ENCOUNTER
Lvm for patient on behalf of Dr. Maciel in regards to recent elevated blood pressure readings per  patient needs to get in to see PCP about elevated blood pressure

## 2024-08-27 DIAGNOSIS — K22.70 BARRETT'S ESOPHAGUS WITHOUT DYSPLASIA: ICD-10-CM

## 2024-08-27 DIAGNOSIS — R10.13 EPIGASTRIC PAIN: ICD-10-CM

## 2024-08-27 NOTE — TELEPHONE ENCOUNTER
Per chart pt is lansoprazole 30mg po daily.   Called pt and left vm for pt to call back regarding if she requested pantoprazole.

## 2024-08-28 RX ORDER — PANTOPRAZOLE SODIUM 20 MG/1
40 TABLET, DELAYED RELEASE ORAL DAILY
Qty: 90 TABLET | Refills: 0 | OUTPATIENT
Start: 2024-08-28

## 2024-08-28 NOTE — TELEPHONE ENCOUNTER
Called pt and pt reports that she did not request the refill on the pantoprazole.  Advised we will cancel the refill .  Verb understanding.

## 2024-10-18 ENCOUNTER — APPOINTMENT (OUTPATIENT)
Dept: GENERAL RADIOLOGY | Facility: HOSPITAL | Age: 79
End: 2024-10-18
Payer: MEDICARE

## 2024-10-18 ENCOUNTER — HOSPITAL ENCOUNTER (EMERGENCY)
Facility: HOSPITAL | Age: 79
Discharge: HOME | End: 2024-10-18
Attending: STUDENT IN AN ORGANIZED HEALTH CARE EDUCATION/TRAINING PROGRAM
Payer: MEDICARE

## 2024-10-18 VITALS
HEART RATE: 60 BPM | BODY MASS INDEX: 22.06 KG/M2 | OXYGEN SATURATION: 99 % | HEIGHT: 61 IN | WEIGHT: 116.84 LBS | RESPIRATION RATE: 18 BRPM | SYSTOLIC BLOOD PRESSURE: 141 MMHG | DIASTOLIC BLOOD PRESSURE: 96 MMHG | TEMPERATURE: 98.4 F

## 2024-10-18 DIAGNOSIS — R07.89 ATYPICAL CHEST PAIN: Primary | ICD-10-CM

## 2024-10-18 LAB
ALBUMIN SERPL-MCNC: 3.8 G/DL (ref 3.5–5.2)
ALBUMIN/GLOB SERPL: 1.6 G/DL
ALP SERPL-CCNC: 91 U/L (ref 39–117)
ALT SERPL W P-5'-P-CCNC: 9 U/L (ref 1–33)
ANION GAP SERPL CALCULATED.3IONS-SCNC: 11.2 MMOL/L (ref 5–15)
AST SERPL-CCNC: 15 U/L (ref 1–32)
BASOPHILS # BLD AUTO: 0.03 10*3/MM3 (ref 0–0.2)
BASOPHILS NFR BLD AUTO: 0.5 % (ref 0–1.5)
BILIRUB SERPL-MCNC: 0.3 MG/DL (ref 0–1.2)
BUN SERPL-MCNC: 12 MG/DL (ref 8–23)
BUN/CREAT SERPL: 16 (ref 7–25)
CALCIUM SPEC-SCNC: 9.4 MG/DL (ref 8.6–10.5)
CHLORIDE SERPL-SCNC: 108 MMOL/L (ref 98–107)
CO2 SERPL-SCNC: 21.8 MMOL/L (ref 22–29)
CREAT SERPL-MCNC: 0.75 MG/DL (ref 0.57–1)
D DIMER PPP FEU-MCNC: 0.37 MCGFEU/ML (ref 0–0.8)
DEPRECATED RDW RBC AUTO: 46.3 FL (ref 37–54)
EGFRCR SERPLBLD CKD-EPI 2021: 80.6 ML/MIN/1.73
EOSINOPHIL # BLD AUTO: 0.06 10*3/MM3 (ref 0–0.4)
EOSINOPHIL NFR BLD AUTO: 1 % (ref 0.3–6.2)
ERYTHROCYTE [DISTWIDTH] IN BLOOD BY AUTOMATED COUNT: 15.4 % (ref 12.3–15.4)
GEN 5 2HR TROPONIN T REFLEX: 7 NG/L
GLOBULIN UR ELPH-MCNC: 2.4 GM/DL
GLUCOSE SERPL-MCNC: 122 MG/DL (ref 65–99)
HCT VFR BLD AUTO: 41.4 % (ref 34–46.6)
HGB BLD-MCNC: 13.5 G/DL (ref 12–15.9)
HOLD SPECIMEN: NORMAL
HOLD SPECIMEN: NORMAL
IMM GRANULOCYTES # BLD AUTO: 0.02 10*3/MM3 (ref 0–0.05)
IMM GRANULOCYTES NFR BLD AUTO: 0.3 % (ref 0–0.5)
LYMPHOCYTES # BLD AUTO: 1.82 10*3/MM3 (ref 0.7–3.1)
LYMPHOCYTES NFR BLD AUTO: 30 % (ref 19.6–45.3)
MCH RBC QN AUTO: 27.3 PG (ref 26.6–33)
MCHC RBC AUTO-ENTMCNC: 32.6 G/DL (ref 31.5–35.7)
MCV RBC AUTO: 83.8 FL (ref 79–97)
MONOCYTES # BLD AUTO: 0.43 10*3/MM3 (ref 0.1–0.9)
MONOCYTES NFR BLD AUTO: 7.1 % (ref 5–12)
NEUTROPHILS NFR BLD AUTO: 3.7 10*3/MM3 (ref 1.7–7)
NEUTROPHILS NFR BLD AUTO: 61.1 % (ref 42.7–76)
NRBC BLD AUTO-RTO: 0 /100 WBC (ref 0–0.2)
PLATELET # BLD AUTO: 351 10*3/MM3 (ref 140–450)
PMV BLD AUTO: 9.9 FL (ref 6–12)
POTASSIUM SERPL-SCNC: 3.7 MMOL/L (ref 3.5–5.2)
PROT SERPL-MCNC: 6.2 G/DL (ref 6–8.5)
RBC # BLD AUTO: 4.94 10*6/MM3 (ref 3.77–5.28)
SODIUM SERPL-SCNC: 141 MMOL/L (ref 136–145)
TROPONIN T DELTA: 1 NG/L
TROPONIN T SERPL HS-MCNC: 6 NG/L
WBC NRBC COR # BLD AUTO: 6.06 10*3/MM3 (ref 3.4–10.8)
WHOLE BLOOD HOLD COAG: NORMAL
WHOLE BLOOD HOLD SPECIMEN: NORMAL

## 2024-10-18 PROCEDURE — 71045 X-RAY EXAM CHEST 1 VIEW: CPT

## 2024-10-18 PROCEDURE — 93005 ELECTROCARDIOGRAM TRACING: CPT | Performed by: STUDENT IN AN ORGANIZED HEALTH CARE EDUCATION/TRAINING PROGRAM

## 2024-10-18 PROCEDURE — 93010 ELECTROCARDIOGRAM REPORT: CPT | Performed by: INTERNAL MEDICINE

## 2024-10-18 PROCEDURE — 84484 ASSAY OF TROPONIN QUANT: CPT | Performed by: STUDENT IN AN ORGANIZED HEALTH CARE EDUCATION/TRAINING PROGRAM

## 2024-10-18 PROCEDURE — 36415 COLL VENOUS BLD VENIPUNCTURE: CPT

## 2024-10-18 PROCEDURE — 84484 ASSAY OF TROPONIN QUANT: CPT

## 2024-10-18 PROCEDURE — 85025 COMPLETE CBC W/AUTO DIFF WBC: CPT

## 2024-10-18 PROCEDURE — 80053 COMPREHEN METABOLIC PANEL: CPT

## 2024-10-18 PROCEDURE — 99284 EMERGENCY DEPT VISIT MOD MDM: CPT

## 2024-10-18 PROCEDURE — 85379 FIBRIN DEGRADATION QUANT: CPT | Performed by: STUDENT IN AN ORGANIZED HEALTH CARE EDUCATION/TRAINING PROGRAM

## 2024-10-18 RX ORDER — SODIUM CHLORIDE 0.9 % (FLUSH) 0.9 %
10 SYRINGE (ML) INJECTION AS NEEDED
Status: DISCONTINUED | OUTPATIENT
Start: 2024-10-18 | End: 2024-10-18 | Stop reason: HOSPADM

## 2024-10-18 RX ORDER — ASPIRIN 325 MG
325 TABLET ORAL ONCE
Status: DISCONTINUED | OUTPATIENT
Start: 2024-10-18 | End: 2024-10-18

## 2024-10-18 NOTE — ED PROVIDER NOTES
EMERGENCY DEPARTMENT ENCOUNTER  Room Number:  02/02  PCP: Brandon Lraose MD  Independent Historians: Patient      HPI:  Chief Complaint: had concerns including Chest Pain.     A complete HPI/ROS/PMH/PSH/SH/FH are unobtainable due to: None    Chronic or social conditions impacting patient care (Social Determinants of Health): None      Context: Viry Randall is a 79 y.o. female with a medical history of GERD, hypertension who presents to the ED c/o acute sharp epigastric/midsternal chest pain.  Onset at rest at 11 AM, > 4 hours prior to arrival.  She reports several minute duration.  She eventually went to her PCPs office who sent her to the ED for further evaluation.      Patient states she walks extensively and never has chest discomfort with walking.  She states the last few times she has had some fluttering in her chest end of the walk.  Patient reports no symptoms at this time.      Review of prior external notes (non-ED) -and- Review of prior external test results outside of this encounter:  TTE 10/13/2020.  Normal EF.      Prescription drug monitoring program review:     N/A    PAST MEDICAL HISTORY  Active Ambulatory Problems     Diagnosis Date Noted    Fibroids 09/15/2016    Lichen sclerosus et atrophicus 09/15/2016    Vaginal atrophy 09/15/2016    Seborrheic keratosis 09/15/2016    Epigastric pain 09/15/2016    Osteopenia after menopause 01/01/2018    Anguiano's esophagus without dysplasia 06/14/2019    Gastroesophageal reflux disease 06/14/2019    Regurgitation of food 06/14/2019    Essential hypertension 10/13/2020    Anemia 02/23/2021    Polyp of colon 02/23/2021    Subserous leiomyoma of uterus 09/20/2021     Resolved Ambulatory Problems     Diagnosis Date Noted    No Resolved Ambulatory Problems     Past Medical History:   Diagnosis Date    Anguiano esophagus     Basal cell carcinoma 2018    Gastritis     GERD (gastroesophageal reflux disease)     History of shingles 02/01/2016    Hx of bone density  study 2017    Hypertension     Migraine     Postherpetic neuralgia     Trauma     Urticaria     Weight loss          PAST SURGICAL HISTORY  Past Surgical History:   Procedure Laterality Date    CATARACT EXTRACTION, BILATERAL      COLONOSCOPY  2010    tics, NBIH, polyp    COLONOSCOPY N/A 2016    Diverticulosis in the sigmoid colon, Internal hemorrhoids    COLONOSCOPY N/A 2021    Procedure: COLONOSCOPY TO CECUM;  Surgeon: Jamil Maciel MD;  Location: [facility] ENDOSCOPY;  Service: Gastroenterology;  Laterality: N/A;  pre: HX OF POLYPS  post: DIVERTICULOSIS, INTERNAL HEMORRHOIDS    DILATATION AND CURETTAGE  1972    DUB in the 70's.    ENDOSCOPY N/A 2016    Z-line irregular, 40 cm from the incisors, Ectopic gastric mucosa in the upper third of the esophagus near the upper esophageal sphincter, Erythematous mucosa in the stomach    ENDOSCOPY N/A 2019    Z-line irregular, 35 cm from the incisors, Erythematous mucosa in the stomach, Path: Carpio's esophagus    ENDOSCOPY N/A 2021    Procedure: ESOPHAGOGASTRODUODENOSCOPY WITH COLD BX'S;  Surgeon: Jamil Maciel MD;  Location: [facility] ENDOSCOPY;  Service: Gastroenterology;  Laterality: N/A;  pre: IRON DEFICIENCY ANEMIA, HX OF CARPIO'S ESOPHAGUS  post: GASTRITIS    ENDOSCOPY N/A 2022    Procedure: ESOPHAGOGASTRODUODENOSCOPY with biopsy;  Surgeon: Jamil Maciel MD;  Location: [facility] ENDOSCOPY;  Service: Gastroenterology;  Laterality: N/A;  PRE - epigastric pain, abnormal ct of abd  POST - gastritis    TONSILLECTOMY      at 4 yoa.  T&A.    TUBAL ABDOMINAL LIGATION      UPPER GASTROINTESTINAL ENDOSCOPY  2015    Surinder Contreras M.D.- carpio's         FAMILY HISTORY  Family History   Problem Relation Age of Onset    Lung cancer Father     Parkinsonism Mother     Osteoporosis Mother     Melanoma Brother 65        , .     Melanoma Brother     Heart disease Brother         Atrial Fib.    Skin cancer Brother     Coronary artery disease  Brother         with stents    Hypertension Brother     Diabetes type II Sister     Alzheimer's disease Sister     Cancer Paternal Grandfather     Cancer Paternal Grandmother     Heart disease Maternal Grandmother     Diabetes Maternal Grandmother     No Known Problems Maternal Grandfather          after falling off a bridge.    No Known Problems Maternal Aunt     Breast cancer Paternal Aunt     BRCA 1/2 Neg Hx     Colon cancer Neg Hx     Endometrial cancer Neg Hx     Ovarian cancer Neg Hx     Malig Hyperthermia Neg Hx     Uterine cancer Neg Hx          SOCIAL HISTORY  Social History     Socioeconomic History    Marital status: Single    Number of children: 0    Years of education: [employer]   Tobacco Use    Smoking status: Never     Passive exposure: Never    Smokeless tobacco: Never   Vaping Use    Vaping status: Never Used   Substance and Sexual Activity    Alcohol use: Yes     Comment: social     Drug use: No    Sexual activity: Not Currently     Birth control/protection: Post-menopausal         ALLERGIES  Patient has no known allergies.      REVIEW OF SYSTEMS  Review of Systems  Included in HPI  All systems reviewed and negative except for those discussed in HPI.      PHYSICAL EXAM    I have reviewed the triage vital signs and nursing notes.    ED Triage Vitals [10/18/24 1524]   Temp Heart Rate Resp BP SpO2   98.4 °F (36.9 °C) 62 18 147/76 97 %      Temp src Heart Rate Source Patient Position BP Location FiO2 (%)   -- -- -- -- --       Physical Exam  GENERAL: alert, no acute distress  SKIN: Warm, dry  HENT: Normocephalic, atraumatic  EYES: no scleral icterus  CV: regular rhythm, regular rate  RESPIRATORY: normal effort, lungs clear  ABDOMEN: soft, nontender, nondistended  MUSCULOSKELETAL: no deformity  NEURO: alert, moves all extremities, follows commands            LAB RESULTS  Recent Results (from the past 24 hours)   Comprehensive Metabolic Panel    Collection Time: 10/18/24  3:35 PM    Specimen: Arm,  Right; Blood   Result Value Ref Range    Glucose 122 (H) 65 - 99 mg/dL    BUN 12 8 - 23 mg/dL    Creatinine 0.75 0.57 - 1.00 mg/dL    Sodium 141 136 - 145 mmol/L    Potassium 3.7 3.5 - 5.2 mmol/L    Chloride 108 (H) 98 - 107 mmol/L    CO2 21.8 (L) 22.0 - 29.0 mmol/L    Calcium 9.4 8.6 - 10.5 mg/dL    Total Protein 6.2 6.0 - 8.5 g/dL    Albumin 3.8 3.5 - 5.2 g/dL    ALT (SGPT) 9 1 - 33 U/L    AST (SGOT) 15 1 - 32 U/L    Alkaline Phosphatase 91 39 - 117 U/L    Total Bilirubin 0.3 0.0 - 1.2 mg/dL    Globulin 2.4 gm/dL    A/G Ratio 1.6 g/dL    BUN/Creatinine Ratio 16.0 7.0 - 25.0    Anion Gap 11.2 5.0 - 15.0 mmol/L    eGFR 80.6 >60.0 mL/min/1.73   High Sensitivity Troponin T    Collection Time: 10/18/24  3:35 PM    Specimen: Arm, Right; Blood   Result Value Ref Range    HS Troponin T 6 <14 ng/L   Green Top (Gel)    Collection Time: 10/18/24  3:35 PM   Result Value Ref Range    Extra Tube Hold for add-ons.    Lavender Top    Collection Time: 10/18/24  3:35 PM   Result Value Ref Range    Extra Tube hold for add-on    Gold Top - SST    Collection Time: 10/18/24  3:35 PM   Result Value Ref Range    Extra Tube Hold for add-ons.    Light Blue Top    Collection Time: 10/18/24  3:35 PM   Result Value Ref Range    Extra Tube Hold for add-ons.    CBC Auto Differential    Collection Time: 10/18/24  3:35 PM    Specimen: Arm, Right; Blood   Result Value Ref Range    WBC 6.06 3.40 - 10.80 10*3/mm3    RBC 4.94 3.77 - 5.28 10*6/mm3    Hemoglobin 13.5 12.0 - 15.9 g/dL    Hematocrit 41.4 34.0 - 46.6 %    MCV 83.8 79.0 - 97.0 fL    MCH 27.3 26.6 - 33.0 pg    MCHC 32.6 31.5 - 35.7 g/dL    RDW 15.4 12.3 - 15.4 %    RDW-SD 46.3 37.0 - 54.0 fl    MPV 9.9 6.0 - 12.0 fL    Platelets 351 140 - 450 10*3/mm3    Neutrophil % 61.1 42.7 - 76.0 %    Lymphocyte % 30.0 19.6 - 45.3 %    Monocyte % 7.1 5.0 - 12.0 %    Eosinophil % 1.0 0.3 - 6.2 %    Basophil % 0.5 0.0 - 1.5 %    Immature Grans % 0.3 0.0 - 0.5 %    Neutrophils, Absolute 3.70 1.70 - 7.00  10*3/mm3    Lymphocytes, Absolute 1.82 0.70 - 3.10 10*3/mm3    Monocytes, Absolute 0.43 0.10 - 0.90 10*3/mm3    Eosinophils, Absolute 0.06 0.00 - 0.40 10*3/mm3    Basophils, Absolute 0.03 0.00 - 0.20 10*3/mm3    Immature Grans, Absolute 0.02 0.00 - 0.05 10*3/mm3    nRBC 0.0 0.0 - 0.2 /100 WBC   D-dimer, Quantitative    Collection Time: 10/18/24  3:35 PM    Specimen: Arm, Right; Blood   Result Value Ref Range    D-Dimer, Quantitative 0.37 0.00 - 0.80 MCGFEU/mL   ECG 12 Lead ED Triage Standing Order; Chest Pain    Collection Time: 10/18/24  4:56 PM   Result Value Ref Range    QT Interval 464 ms    QTC Interval 464 ms   High Sensitivity Troponin T 2Hr    Collection Time: 10/18/24  5:33 PM    Specimen: Blood   Result Value Ref Range    HS Troponin T 7 <14 ng/L    Troponin T Delta 1 >=-4 - <+4 ng/L         RADIOLOGY  XR Chest 1 View    Result Date: 10/18/2024  XR CHEST 1 VW-  HISTORY: Female who is 79 years-old, chest pain  TECHNIQUE: Frontal views of the chest  COMPARISON: 1/23/2021  FINDINGS: The heart is mildly enlarged. Pulmonary vasculature is unremarkable. Aorta is tortuous. No focal pulmonary consolidation, pleural effusion, or pneumothorax. No acute osseous process.      No focal pulmonary consolidation. Mild cardiomegaly. Tortuous aorta. Follow-up as clinically indicated.    This report was finalized on 10/18/2024 4:16 PM by Dr. Jesse Chino M.D on Workstation: [id number]         MEDICATIONS GIVEN IN ER  Medications   sodium chloride 0.9 % flush 10 mL (has no administration in time range)         ORDERS PLACED DURING THIS VISIT:  Orders Placed This Encounter   Procedures    XR Chest 1 View    [place] Draw    Comprehensive Metabolic Panel    High Sensitivity Troponin T    CBC Auto Differential    High Sensitivity Troponin T 2Hr    D-dimer, Quantitative    NPO Diet NPO Type: Strict NPO    Undress & Gown    Continuous Pulse Oximetry    Oxygen Therapy- Nasal Cannula; Titrate 1-6 LPM Per SpO2; 90 - 95%    ECG 12  Lead ED Triage Standing Order; Chest Pain    ECG 12 Lead ED Triage Standing Order; Chest Pain    Insert Peripheral IV    CBC & Differential    Green Top (Gel)    Lavender Top    Gold Top - SST    Light Blue Top         OUTPATIENT MEDICATION MANAGEMENT:  Current Facility-Administered Medications Ordered in Epic   Medication Dose Route Frequency Provider Last Rate Last Admin    sodium chloride 0.9 % flush 10 mL  10 mL Intravenous PRN Evan Crocker MD         Current Outpatient Medications Ordered in Epic   Medication Sig Dispense Refill    betamethasone valerate (VALISONE) 0.1 % cream Apply  topically to the appropriate area as directed Take As Directed. Apply a small amount to the labia  Monday - Wednesday - Friday 45 g 3    calcium carbonate (OS-ABNER) 600 MG tablet Take 1 tablet by mouth Daily.      Cholecalciferol (VITAMIN D) 2000 units tablet       clobetasol (TEMOVATE) 0.05 % cream       FLUoxetine (PROzac) 20 MG capsule       lansoprazole (PREVACID) 30 MG capsule Take 1 capsule by mouth Daily. 90 capsule 3    lisinopril (PRINIVIL,ZESTRIL) 5 MG tablet Take 1 tablet by mouth Daily.      Multiple Vitamins-Minerals (MULTIVITAMIN ADULTS PO) Take 1 tablet by mouth Daily.      raloxifene (EVISTA) 60 MG tablet Take 1 tablet by mouth Daily. 90 tablet 3         PROCEDURES  Procedures            PROGRESS, DATA ANALYSIS, CONSULTS, AND MEDICAL DECISION MAKING  All labs have been independently interpreted by me.  All radiology studies have been reviewed by me. All EKG's have been independently viewed and interpreted by me.  Discussion below represents my analysis of pertinent findings related to patient's condition, differential diagnosis, treatment plan and final disposition.    Differential diagnosis includes but is not limited to ACS, STEMI, NSTEMI, GERD, gastritis, esophagitis, pneumonia, pleurisy.    Clinical Scores: HEART Score: 3                   ED Course as of 10/18/24 1805   Fri Oct 18, 2024   1645 Patient  reports that she had sharp midsternal chest pain at approximately 11 AM.  No other associated symptoms.  States she usually walks extensively without any discomfort.  She went to see Dr. Larose who sent her to the emergency department.  Asymptomatic at this time.  Will obtain basic labs, cardiac enzyme, EKG, chest x-ray.  Patient agreeable with plan. [DN]   1654 I reviewed chest x-ray image, no focal infiltrate.  Interpreted by self/EP.  I reviewed radiologist interpretation of chest x-ray image [DN]   1700 I viewed EKG, normal sinus rhythm, rate 60, leftward axis, normal intervals, no significant ST changes, no STEMI  NSR rate 60   [DN]   1708 HS Troponin T: 6 [DN]   1744 I discussed patient with Dr. Larose, states that when she was in his office she was tachypneic and in distress.  Reports she has recently had multiple long trips to and from [place] with her brother.  Has low suspicion for but wishes to rule out PE.  I will add on D-dimer. [DN]   1747 Patient aware of plan for D-dimer to rule out PE. [DN]   1803 D-Dimer, Quant: 0.37 [DN]   1804 Troponin T Delta: 1 [DN]   1804 I discussed results with patient, she is agreeable with plan for discharge and follow-up outpatient with Dr. Larose. [DN]      ED Course User Index  [DN] Evan Crocker MD             AS OF 18:05 EDT VITALS:    BP - 139/91  HR - 60  TEMP - 98.4 °F (36.9 °C)  O2 SATS - 97%    COMPLEXITY OF CARE  Admission was considered but after careful review of the patient's presentation, physical examination, diagnostic results, and response to treatment the patient may be safely discharged with outpatient follow-up.      DIAGNOSIS  Final diagnoses:   Atypical chest pain         DISPOSITION  ED Disposition       ED Disposition   Discharge    Condition   Stable    Comment   --                Please note that portions of this document were completed with a voice recognition program.    Note Disclaimer: At [facility], we believe that sharing information  builds trust and better relationships. You are receiving this note because you recently visited [facility]. It is possible you will see health information before a provider has talked with you about it. This kind of information can be easy to misunderstand. To help you fully understand what it means for your health, we urge you to discuss this note with your provider.         Evan Crocker MD  10/18/24 1737       Evan Crocker MD  10/18/24 1801

## 2024-10-18 NOTE — ED NOTES
Pt arrives via ems from doctor's office for chest pain on the left side of her chest. EMS gave one nitro and 324 mg of aspirin.   Pt denies any heart history.  EMS notes the patient went from sinus rhythm and flipped into atrial fibrillation for a minute but then converted back to sinus rhythm

## 2024-10-19 LAB
QT INTERVAL: 464 MS
QTC INTERVAL: 464 MS

## 2024-12-16 DIAGNOSIS — R10.13 EPIGASTRIC PAIN: ICD-10-CM

## 2024-12-16 DIAGNOSIS — K22.70 BARRETT'S ESOPHAGUS WITHOUT DYSPLASIA: ICD-10-CM

## 2024-12-17 RX ORDER — PANTOPRAZOLE SODIUM 20 MG/1
40 TABLET, DELAYED RELEASE ORAL DAILY
Qty: 90 TABLET | Refills: 3 | Status: SHIPPED | OUTPATIENT
Start: 2024-12-17

## 2025-02-06 ENCOUNTER — TRANSCRIBE ORDERS (OUTPATIENT)
Dept: ADMINISTRATIVE | Facility: HOSPITAL | Age: 80
End: 2025-02-06
Payer: MEDICARE

## 2025-02-06 DIAGNOSIS — R07.9 CHEST PAIN, UNSPECIFIED TYPE: Primary | ICD-10-CM

## 2025-02-06 DIAGNOSIS — R00.2 PALPITATIONS: ICD-10-CM

## 2025-02-11 ENCOUNTER — TELEPHONE (OUTPATIENT)
Dept: CARDIOLOGY | Facility: CLINIC | Age: 80
End: 2025-02-11
Payer: MEDICARE

## 2025-02-12 ENCOUNTER — HOSPITAL ENCOUNTER (OUTPATIENT)
Dept: CARDIOLOGY | Facility: HOSPITAL | Age: 80
Discharge: HOME | End: 2025-02-12
Admitting: INTERNAL MEDICINE
Payer: MEDICARE

## 2025-02-12 VITALS
BODY MASS INDEX: 21.9 KG/M2 | SYSTOLIC BLOOD PRESSURE: 185 MMHG | HEART RATE: 60 BPM | WEIGHT: 116 LBS | DIASTOLIC BLOOD PRESSURE: 86 MMHG | HEIGHT: 61 IN

## 2025-02-12 DIAGNOSIS — R07.9 CHEST PAIN, UNSPECIFIED TYPE: ICD-10-CM

## 2025-02-12 DIAGNOSIS — R00.2 PALPITATIONS: ICD-10-CM

## 2025-02-12 LAB
AORTIC DIMENSIONLESS INDEX: 0.63 (DI)
ASCENDING AORTA: 3.6 CM
AV MEAN PRESS GRAD SYS DOP V1V2: 2 MMHG
AV VMAX SYS DOP: 109 CM/SEC
BH CV ECHO MEAS - ACS: 2.05 CM
BH CV ECHO MEAS - AO MAX PG: 4.8 MMHG
BH CV ECHO MEAS - AO ROOT AREA (BSA CORRECTED): 2.1 CM2
BH CV ECHO MEAS - AO ROOT DIAM: 3.1 CM
BH CV ECHO MEAS - AO V2 VTI: 26.8 CM
BH CV ECHO MEAS - AVA(I,D): 2.04 CM2
BH CV ECHO MEAS - EDV(CUBED): 127.8 ML
BH CV ECHO MEAS - EDV(MOD-SP2): 76 ML
BH CV ECHO MEAS - EDV(MOD-SP4): 91 ML
BH CV ECHO MEAS - EF(MOD-SP2): 56.6 %
BH CV ECHO MEAS - EF(MOD-SP4): 50.5 %
BH CV ECHO MEAS - ESV(CUBED): 39.1 ML
BH CV ECHO MEAS - ESV(MOD-SP2): 33 ML
BH CV ECHO MEAS - ESV(MOD-SP4): 45 ML
BH CV ECHO MEAS - FS: 32.6 %
BH CV ECHO MEAS - IVS/LVPW: 1.13 CM
BH CV ECHO MEAS - IVSD: 0.98 CM
BH CV ECHO MEAS - LAT PEAK E' VEL: 5.9 CM/SEC
BH CV ECHO MEAS - LV DIASTOLIC VOL/BSA (35-75): 60.7 CM2
BH CV ECHO MEAS - LV MASS(C)D: 165.5 GRAMS
BH CV ECHO MEAS - LV MAX PG: 2.06 MMHG
BH CV ECHO MEAS - LV MEAN PG: 1 MMHG
BH CV ECHO MEAS - LV SYSTOLIC VOL/BSA (12-30): 30 CM2
BH CV ECHO MEAS - LV V1 MAX: 71.8 CM/SEC
BH CV ECHO MEAS - LV V1 VTI: 17 CM
BH CV ECHO MEAS - LVIDD: 5 CM
BH CV ECHO MEAS - LVIDS: 3.4 CM
BH CV ECHO MEAS - LVOT AREA: 3.2 CM2
BH CV ECHO MEAS - LVOT DIAM: 2.02 CM
BH CV ECHO MEAS - LVPWD: 0.87 CM
BH CV ECHO MEAS - MED PEAK E' VEL: 4.6 CM/SEC
BH CV ECHO MEAS - MR MAX PG: 46.8 MMHG
BH CV ECHO MEAS - MR MAX VEL: 342.2 CM/SEC
BH CV ECHO MEAS - MV A DUR: 0.14 SEC
BH CV ECHO MEAS - MV A MAX VEL: 84.4 CM/SEC
BH CV ECHO MEAS - MV DEC SLOPE: 245.5 CM/SEC2
BH CV ECHO MEAS - MV DEC TIME: 0.27 SEC
BH CV ECHO MEAS - MV E MAX VEL: 64 CM/SEC
BH CV ECHO MEAS - MV E/A: 0.76
BH CV ECHO MEAS - MV MAX PG: 4.6 MMHG
BH CV ECHO MEAS - MV MEAN PG: 1.21 MMHG
BH CV ECHO MEAS - MV P1/2T: 86.1 MSEC
BH CV ECHO MEAS - MV V2 VTI: 36.8 CM
BH CV ECHO MEAS - MVA(P1/2T): 2.6 CM2
BH CV ECHO MEAS - MVA(VTI): 1.49 CM2
BH CV ECHO MEAS - PA V2 MAX: 55.4 CM/SEC
BH CV ECHO MEAS - PULM A REVS DUR: 0.13 SEC
BH CV ECHO MEAS - PULM A REVS VEL: 28.3 CM/SEC
BH CV ECHO MEAS - PULM DIAS VEL: 48.8 CM/SEC
BH CV ECHO MEAS - PULM S/D: 1.42
BH CV ECHO MEAS - PULM SYS VEL: 69.2 CM/SEC
BH CV ECHO MEAS - QP/QS: 0.58
BH CV ECHO MEAS - RAP SYSTOLE: 8 MMHG
BH CV ECHO MEAS - RV MAX PG: 0.49 MMHG
BH CV ECHO MEAS - RV V1 MAX: 35.1 CM/SEC
BH CV ECHO MEAS - RV V1 VTI: 7.6 CM
BH CV ECHO MEAS - RVOT DIAM: 2.3 CM
BH CV ECHO MEAS - RVSP: 25.3 MMHG
BH CV ECHO MEAS - SV(LVOT): 54.7 ML
BH CV ECHO MEAS - SV(MOD-SP2): 43 ML
BH CV ECHO MEAS - SV(MOD-SP4): 46 ML
BH CV ECHO MEAS - SV(RVOT): 31.5 ML
BH CV ECHO MEAS - SVI(LVOT): 36.5 ML/M2
BH CV ECHO MEAS - SVI(MOD-SP2): 28.7 ML/M2
BH CV ECHO MEAS - SVI(MOD-SP4): 30.7 ML/M2
BH CV ECHO MEAS - TAPSE (>1.6): 2.7 CM
BH CV ECHO MEAS - TR MAX PG: 17.3 MMHG
BH CV ECHO MEAS - TR MAX VEL: 208.2 CM/SEC
BH CV ECHO MEASUREMENTS AVERAGE E/E' RATIO: 12.19
BH CV XLRA - RV BASE: 3.2 CM
BH CV XLRA - RV LENGTH: 6.4 CM
BH CV XLRA - RV MID: 2.23 CM
BH CV XLRA - TDI S': 15.1 CM/SEC
LEFT ATRIUM VOLUME INDEX: 44.9 ML/M2
LV EF BIPLANE MOD: 52.8 %
SINUS: 3.8 CM
STJ: 3 CM

## 2025-02-12 PROCEDURE — 93306 TTE W/DOPPLER COMPLETE: CPT

## 2025-02-13 ENCOUNTER — OFFICE VISIT (OUTPATIENT)
Dept: GASTROENTEROLOGY | Facility: CLINIC | Age: 80
End: 2025-02-13
Payer: MEDICARE

## 2025-02-13 VITALS
DIASTOLIC BLOOD PRESSURE: 93 MMHG | HEART RATE: 70 BPM | BODY MASS INDEX: 22.86 KG/M2 | TEMPERATURE: 97.7 F | HEIGHT: 61 IN | OXYGEN SATURATION: 98 % | WEIGHT: 121.1 LBS | SYSTOLIC BLOOD PRESSURE: 166 MMHG

## 2025-02-13 DIAGNOSIS — K22.70 BARRETT'S ESOPHAGUS WITHOUT DYSPLASIA: ICD-10-CM

## 2025-02-13 DIAGNOSIS — K21.9 GASTROESOPHAGEAL REFLUX DISEASE, UNSPECIFIED WHETHER ESOPHAGITIS PRESENT: ICD-10-CM

## 2025-02-13 DIAGNOSIS — R10.13 EPIGASTRIC PAIN: Primary | ICD-10-CM

## 2025-02-13 PROCEDURE — 1159F MED LIST DOCD IN RCRD: CPT

## 2025-02-13 PROCEDURE — 3080F DIAST BP >= 90 MM HG: CPT

## 2025-02-13 PROCEDURE — 99214 OFFICE O/P EST MOD 30 MIN: CPT

## 2025-02-13 PROCEDURE — 1160F RVW MEDS BY RX/DR IN RCRD: CPT

## 2025-02-13 PROCEDURE — 3077F SYST BP >= 140 MM HG: CPT

## 2025-02-13 RX ORDER — BUSPIRONE HYDROCHLORIDE 15 MG/1
1 TABLET ORAL 2 TIMES DAILY
COMMUNITY
Start: 2024-12-20

## 2025-02-13 RX ORDER — MECOBALAMIN 5000 MCG
15 TABLET,DISINTEGRATING ORAL 2 TIMES DAILY
Qty: 180 CAPSULE | Refills: 1 | Status: SHIPPED | OUTPATIENT
Start: 2025-02-13

## 2025-02-13 RX ORDER — LISINOPRIL 10 MG/1
10 TABLET ORAL DAILY
COMMUNITY
Start: 2024-08-23

## 2025-02-13 NOTE — PROGRESS NOTES
"Chief Complaint  Anguiano's esophagus without dysplasia    Subjective          History of Present Illness    Viry Randall is a  79 y.o. female presents for follow-up for short segment Anguiano's esophagus.  She previously followed with Dr. Maciel.     She presents today for follow-up.  She has been taking pantoprazole 20 mg twice daily for several years now to keep her reflux under control and for history of Anguiano's esophagus.  This has typically worked for her in the past.  Recently she has had epigastric discomfort which she describes as chest pain. She states she has discussed this with Dr. Mireles and is having Echocardiogram and Coronary calcium scan because of this. She denies any difficulty swallowing. No black or bloody stool. Good appetites, tolerating diet well. No unintentional weight loss.     EGD 9/9/22 - w/ no gross lesions in entire examined esophagus. Path looked ok. No metaplasia or dysplasia     Colonoscopy 6/17/2021 showed diverticulosis in sigmoid colon, internal hemorrhoids, otherwise normal exam.    Objective   Vital Signs:   /93 (Patient Position: Sitting, Cuff Size: Adult)   Pulse 70   Temp 97.7 °F (36.5 °C)   Ht 154.9 cm (60.98\")   Wt 54.9 kg (121 lb 1.6 oz)   SpO2 98%   BMI 22.89 kg/m²       Physical Exam  Constitutional:       General: She is not in acute distress.     Appearance: Normal appearance.   Eyes:      General: No scleral icterus.  Pulmonary:      Effort: Pulmonary effort is normal.   Abdominal:      General: Abdomen is flat. There is no distension.      Tenderness: There is no abdominal tenderness. There is no guarding.   Skin:     Coloration: Skin is not jaundiced.   Neurological:      General: No focal deficit present.      Mental Status: She is alert and oriented to person, place, and time.   Psychiatric:         Mood and Affect: Mood normal.         Behavior: Behavior normal.          Result Review :   The following data was reviewed by: Britta Jones PA-C on " 02/13/2025:  CMP          10/18/2024    15:35   CMP   Glucose 122    BUN 12    Creatinine 0.75    EGFR 80.6    Sodium 141    Potassium 3.7    Chloride 108    Calcium 9.4    Total Protein 6.2    Albumin 3.8    Globulin 2.4    Total Bilirubin 0.3    Alkaline Phosphatase 91    AST (SGOT) 15    ALT (SGPT) 9    Albumin/Globulin Ratio 1.6    BUN/Creatinine Ratio 16.0    Anion Gap 11.2      CBC          10/18/2024    15:35   CBC   WBC 6.06    RBC 4.94    Hemoglobin 13.5    Hematocrit 41.4    MCV 83.8    MCH 27.3    MCHC 32.6    RDW 15.4    Platelets 351              Assessment:   Diagnoses and all orders for this visit:    1. Epigastric pain (Primary)  -     lansoprazole (PREVACID) 15 MG capsule; Take 1 capsule by mouth 2 (Two) Times a Day.  Dispense: 180 capsule; Refill: 1    2. Anguiano's esophagus without dysplasia  -     lansoprazole (PREVACID) 15 MG capsule; Take 1 capsule by mouth 2 (Two) Times a Day.  Dispense: 180 capsule; Refill: 1    3. Gastroesophageal reflux disease, unspecified whether esophagitis present  -     lansoprazole (PREVACID) 15 MG capsule; Take 1 capsule by mouth 2 (Two) Times a Day.  Dispense: 180 capsule; Refill: 1          Plan:   -We will switch to lansoprazole given occasional breakthrough symptoms of reflux recently.  She does have some chest/epigastric discomfort.  Discussed that it has been 3 years since her last EGD and w/ history of Anguiano's I would advise getting one done in the near future pending normal cardiac workup. She is not interested in EGD at this time and would like to see how she responds to lansoprazole. Will arrange f/u in 2 months to re-assess and she will call in the meantime if symptoms worsen. Recommend she avoid spicy/acidic foods and alcohol and avoid eating close to bedtime.       Follow Up   Return in about 2 months (around 4/13/2025).    Dragon dictation used throughout this note.         Britta Jones PA-C  [facility]  [address] -  [address]  [place] [address]  Office: [phone]

## 2025-03-03 ENCOUNTER — OFFICE VISIT (OUTPATIENT)
Dept: CARDIOLOGY | Facility: CLINIC | Age: 80
End: 2025-03-03
Payer: MEDICARE

## 2025-03-03 VITALS
DIASTOLIC BLOOD PRESSURE: 100 MMHG | BODY MASS INDEX: 22.34 KG/M2 | HEART RATE: 63 BPM | SYSTOLIC BLOOD PRESSURE: 194 MMHG | HEIGHT: 62 IN | OXYGEN SATURATION: 98 % | WEIGHT: 121.4 LBS

## 2025-03-03 DIAGNOSIS — R07.9 CHEST PAIN, UNSPECIFIED TYPE: Primary | ICD-10-CM

## 2025-03-03 DIAGNOSIS — R07.2 PRECORDIAL CHEST PAIN: ICD-10-CM

## 2025-03-03 PROCEDURE — 3080F DIAST BP >= 90 MM HG: CPT | Performed by: INTERNAL MEDICINE

## 2025-03-03 PROCEDURE — 3077F SYST BP >= 140 MM HG: CPT | Performed by: INTERNAL MEDICINE

## 2025-03-03 PROCEDURE — 99204 OFFICE O/P NEW MOD 45 MIN: CPT | Performed by: INTERNAL MEDICINE

## 2025-03-03 RX ORDER — NEBIVOLOL 2.5 MG/1
2.5 TABLET ORAL DAILY
Qty: 30 TABLET | Refills: 3 | Status: SHIPPED | OUTPATIENT
Start: 2025-03-03

## 2025-03-03 RX ORDER — VALSARTAN AND HYDROCHLOROTHIAZIDE 80; 12.5 MG/1; MG/1
1 TABLET, FILM COATED ORAL DAILY
Qty: 30 TABLET | Refills: 3 | Status: SHIPPED | OUTPATIENT
Start: 2025-03-03
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
## 2025-03-11 ENCOUNTER — CLINICAL SUPPORT (OUTPATIENT)
Dept: CARDIOLOGY | Facility: CLINIC | Age: 80
End: 2025-03-11
Payer: COMMERCIAL

## 2025-03-11 VITALS — SYSTOLIC BLOOD PRESSURE: 164 MMHG | DIASTOLIC BLOOD PRESSURE: 90 MMHG | HEART RATE: 63 BPM

## 2025-03-11 DIAGNOSIS — I10 ESSENTIAL HYPERTENSION: Primary | ICD-10-CM

## 2025-03-11 RX ORDER — VALSARTAN AND HYDROCHLOROTHIAZIDE 80; 12.5 MG/1; MG/1
2 TABLET, FILM COATED ORAL DAILY
Qty: 60 TABLET | Refills: 3 | Status: SHIPPED | OUTPATIENT
Start: 2025-03-11

## 2025-03-11 NOTE — PROGRESS NOTES
Patient is being seen in the office today for a B/P check.  started her on Valsartan-HCTZ and nebivolol. Today her B/P reads in the /100 HR 63, /98. Patient was also rushing to her apt today. We let her sit for at least 20 minutes before checking her B/P again . /92, /90. Consulted with Dr. George, he would like for the patient to double up on her Valsartan-HCTZ (2 pills in the AM). She has to have blood work in two weeks. At that time, she will come back for another B/P check. Also, patient needs to keep a log of her B/P. Bring those with her when she comes back with her B/P machine to check accuracy. Patient is scheduled for 03/25/25 for a B/P Check. She verbalized understanding.

## 2025-03-13 ENCOUNTER — TELEPHONE (OUTPATIENT)
Dept: CARDIOLOGY | Facility: CLINIC | Age: 80
End: 2025-03-13
Payer: COMMERCIAL

## 2025-03-14 ENCOUNTER — HOSPITAL ENCOUNTER (OUTPATIENT)
Dept: CARDIOLOGY | Facility: HOSPITAL | Age: 80
Discharge: HOME | End: 2025-03-14
Payer: MEDICARE

## 2025-03-14 DIAGNOSIS — R07.2 PRECORDIAL CHEST PAIN: ICD-10-CM

## 2025-03-14 LAB
BH CV STRESS BP STAGE 1: NORMAL
BH CV STRESS BP STAGE 2: NORMAL
BH CV STRESS DURATION MIN STAGE 1: 3
BH CV STRESS DURATION MIN STAGE 2: 2
BH CV STRESS DURATION SEC STAGE 1: 0
BH CV STRESS DURATION SEC STAGE 2: 10
BH CV STRESS GRADE STAGE 1: 10
BH CV STRESS GRADE STAGE 2: 12
BH CV STRESS HR STAGE 1: 101
BH CV STRESS HR STAGE 2: 125
BH CV STRESS METS STAGE 1: 5
BH CV STRESS METS STAGE 2: 7.5
BH CV STRESS PROTOCOL 1: NORMAL
BH CV STRESS RECOVERY BP: NORMAL MMHG
BH CV STRESS RECOVERY HR: 77 BPM
BH CV STRESS SPEED STAGE 1: 1.7
BH CV STRESS SPEED STAGE 2: 2.5
BH CV STRESS STAGE 1: 1
BH CV STRESS STAGE 2: 2
MAXIMAL PREDICTED HEART RATE: 140 BPM
PERCENT MAX PREDICTED HR: 89.29 %
STRESS BASELINE BP: NORMAL MMHG
STRESS BASELINE HR: 69 BPM
STRESS PERCENT HR: 105 %
STRESS POST ESTIMATED WORKLOAD: 7.5 METS
STRESS POST EXERCISE DUR MIN: 5 MIN
STRESS POST EXERCISE DUR SEC: 10 SEC
STRESS POST PEAK BP: NORMAL MMHG
STRESS POST PEAK HR: 125 BPM
STRESS TARGET HR: 119 BPM

## 2025-03-14 PROCEDURE — 93017 CV STRESS TEST TRACING ONLY: CPT

## 2025-03-17 ENCOUNTER — TELEPHONE (OUTPATIENT)
Dept: CARDIOLOGY | Facility: CLINIC | Age: 80
End: 2025-03-17
Payer: COMMERCIAL

## 2025-03-17 RX ORDER — NEBIVOLOL 5 MG/1
5 TABLET ORAL DAILY
Qty: 90 TABLET | Refills: 0 | Status: SHIPPED | OUTPATIENT
Start: 2025-03-17

## 2025-03-27 ENCOUNTER — TELEPHONE (OUTPATIENT)
Dept: CARDIOLOGY | Facility: CLINIC | Age: 80
End: 2025-03-27
Payer: COMMERCIAL

## 2025-03-27 NOTE — TELEPHONE ENCOUNTER
Pt called and l/m re a medication question.    Returned pt call but no answer l/m for pt to call back the office.      Thanks  Yana CULVER

## 2025-04-30 ENCOUNTER — OFFICE VISIT (OUTPATIENT)
Dept: GASTROENTEROLOGY | Facility: CLINIC | Age: 80
End: 2025-04-30
Payer: MEDICARE

## 2025-04-30 VITALS
SYSTOLIC BLOOD PRESSURE: 129 MMHG | WEIGHT: 120.1 LBS | HEART RATE: 73 BPM | DIASTOLIC BLOOD PRESSURE: 83 MMHG | BODY MASS INDEX: 20.51 KG/M2 | HEIGHT: 64 IN | TEMPERATURE: 96.4 F

## 2025-04-30 DIAGNOSIS — K22.70 BARRETT'S ESOPHAGUS WITHOUT DYSPLASIA: ICD-10-CM

## 2025-04-30 DIAGNOSIS — K21.9 GASTROESOPHAGEAL REFLUX DISEASE, UNSPECIFIED WHETHER ESOPHAGITIS PRESENT: ICD-10-CM

## 2025-04-30 DIAGNOSIS — R10.13 EPIGASTRIC PAIN: ICD-10-CM

## 2025-04-30 PROCEDURE — 1159F MED LIST DOCD IN RCRD: CPT

## 2025-04-30 PROCEDURE — 1160F RVW MEDS BY RX/DR IN RCRD: CPT

## 2025-04-30 PROCEDURE — 99214 OFFICE O/P EST MOD 30 MIN: CPT

## 2025-04-30 PROCEDURE — 3079F DIAST BP 80-89 MM HG: CPT

## 2025-04-30 PROCEDURE — 3074F SYST BP LT 130 MM HG: CPT

## 2025-04-30 RX ORDER — MECOBALAMIN 5000 MCG
15 TABLET,DISINTEGRATING ORAL 2 TIMES DAILY
Qty: 180 CAPSULE | Refills: 1 | Status: SHIPPED | OUTPATIENT
Start: 2025-04-30

## 2025-04-30 NOTE — PROGRESS NOTES
"Chief Complaint  Heartburn    Subjective          History of Present Illness    Viry Randall is a  79 y.o. female presents for follow-up for short segment Anguiano's esophagus.  She previously followed with Dr. Maciel and will be following with Dr. Escalona going forward.    I last saw her in the office 2/13/2025.  At that point she was having epigastric discomfort described as chest pain.  She discussed this with her cardiologist and had plans undergo echocardiogram and coronary calcium scan.  She was switched to lansoprazole it was recommended she undergo EGD if she did not have any improvement.  She did see cardiology back in March and was noted to have significantly elevated blood pressure on arrival but normal blood pressure throughout office visit.  She was started on valsartan/hydrochlorothiazide and Nebivolol.    She has been doing well on Lansoprazole since her last visit. No breakthrough symptoms or dysphagia. No odynophagia or unintentional weight loss. No black or bloody stool. She has been feeling more stressed than usual recently but has been managing this with exercise and by focusing on things she enjoys.     EGD 9/9/22 - w/ no gross lesions in entire examined esophagus. Path looked ok. No metaplasia or dysplasia      Colonoscopy 6/17/2021 showed diverticulosis in sigmoid colon, internal hemorrhoids, otherwise normal exam.    Objective   Vital Signs:   /83   Pulse 73   Temp 96.4 °F (35.8 °C)   Ht 162.6 cm (64\")   Wt 54.5 kg (120 lb 1.6 oz)   BMI 20.62 kg/m²       Physical Exam  Constitutional:       General: She is not in acute distress.     Appearance: Normal appearance.   Eyes:      General: No scleral icterus.  Cardiovascular:      Rate and Rhythm: Normal rate.   Pulmonary:      Effort: Pulmonary effort is normal.   Abdominal:      General: Abdomen is flat. There is no distension.      Tenderness: There is no abdominal tenderness. There is no guarding.   Skin:     Coloration: Skin is not " jaundiced.   Neurological:      General: No focal deficit present.      Mental Status: She is alert and oriented to person, place, and time.   Psychiatric:         Mood and Affect: Mood normal.         Behavior: Behavior normal.          Result Review :   The following data was reviewed by: Britta Jones PA-C on 04/30/2025:  CMP          10/18/2024    15:35   CMP   Glucose 122    BUN 12    Creatinine 0.75    EGFR 80.6    Sodium 141    Potassium 3.7    Chloride 108    Calcium 9.4    Total Protein 6.2    Albumin 3.8    Globulin 2.4    Total Bilirubin 0.3    Alkaline Phosphatase 91    AST (SGOT) 15    ALT (SGPT) 9    Albumin/Globulin Ratio 1.6    BUN/Creatinine Ratio 16.0    Anion Gap 11.2      CBC          10/18/2024    15:35   CBC   WBC 6.06    RBC 4.94    Hemoglobin 13.5    Hematocrit 41.4    MCV 83.8    MCH 27.3    MCHC 32.6    RDW 15.4    Platelets 351              Assessment:   Diagnoses and all orders for this visit:    1. Epigastric pain  -     lansoprazole (PREVACID) 15 MG capsule; Take 1 capsule by mouth 2 (Two) Times a Day.  Dispense: 180 capsule; Refill: 1    2. Anguiano's esophagus without dysplasia  -     lansoprazole (PREVACID) 15 MG capsule; Take 1 capsule by mouth 2 (Two) Times a Day.  Dispense: 180 capsule; Refill: 1    3. Gastroesophageal reflux disease, unspecified whether esophagitis present  -     lansoprazole (PREVACID) 15 MG capsule; Take 1 capsule by mouth 2 (Two) Times a Day.  Dispense: 180 capsule; Refill: 1          Plan:   -Continue lansoprazole. Symptoms are well controlled on this. We have discussed EGD but given control in symptoms she would like to monitor for now. Discussed that if reflux returns or if she has any difficulty swallowing we will need to proceed with EGD at that point. Otherwise she will need one at least in 2 years for Anguiano's screening.   -Recommend she avoid spicy/acidic foods and alcohol and avoid eating close to bedtime.       Follow Up   Return in about 1 year  (around 4/30/2026), or if symptoms worsen or fail to improve.    Dragon dictation used throughout this note.         Britta Jones PA-C  [facility]  [address] [place] [address]  Office: [phone]   no

## 2025-06-18 RX ORDER — NEBIVOLOL 5 MG/1
5 TABLET ORAL DAILY
Qty: 90 TABLET | Refills: 0 | Status: SHIPPED | OUTPATIENT
Start: 2025-06-18

## 2025-06-18 RX ORDER — VALSARTAN AND HYDROCHLOROTHIAZIDE 80; 12.5 MG/1; MG/1
2 TABLET, FILM COATED ORAL DAILY
Qty: 60 TABLET | Refills: 0 | Status: SHIPPED | OUTPATIENT
Start: 2025-06-18

## 2025-08-18 ENCOUNTER — TRANSCRIBE ORDERS (OUTPATIENT)
Dept: ADMINISTRATIVE | Facility: HOSPITAL | Age: 80
End: 2025-08-18
Payer: MEDICARE

## 2025-08-18 DIAGNOSIS — Z12.31 SCREENING MAMMOGRAM FOR BREAST CANCER: Primary | ICD-10-CM

## 2025-08-18 DIAGNOSIS — R41.9 LOSS OF ALERTNESS: ICD-10-CM

## 2025-08-20 RX ORDER — VALSARTAN AND HYDROCHLOROTHIAZIDE 80; 12.5 MG/1; MG/1
2 TABLET, FILM COATED ORAL DAILY
Qty: 60 TABLET | Refills: 0 | Status: SHIPPED | OUTPATIENT
Start: 2025-08-20

## (undated) DEVICE — KT ORCA ORCAPOD DISP STRL

## (undated) DEVICE — SENSR O2 OXIMAX FNGR A/ 18IN NONSTR

## (undated) DEVICE — BITEBLOCK OMNI BLOC

## (undated) DEVICE — LN SMPL CO2 SHTRM SD STREAM W/M LUER

## (undated) DEVICE — FRCP BX RADJAW4 NDL 2.8 240CM LG OG BX40

## (undated) DEVICE — TUBING, SUCTION, 1/4" X 10', STRAIGHT: Brand: MEDLINE

## (undated) DEVICE — CANN NASL CO2 TRULINK W/O2 A/

## (undated) DEVICE — THE TORRENT IRRIGATION SCOPE CONNECTOR IS USED WITH THE TORRENT IRRIGATION TUBING TO PROVIDE IRRIGATION FLUIDS SUCH AS STERILE WATER DURING GASTROINTESTINAL ENDOSCOPIC PROCEDURES WHEN USED IN CONJUNCTION WITH AN IRRIGATION PUMP (OR ELECTROSURGICAL UNIT).: Brand: TORRENT

## (undated) DEVICE — CANN O2 ETCO2 FITS ALL CONN CO2 SMPL A/ 7IN DISP LF

## (undated) DEVICE — ADAPT CLN BIOGUARD AIR/H2O DISP

## (undated) DEVICE — MSK ENDO PORT O2 POM ELITE CURAPLEX A/

## (undated) DEVICE — Device: Brand: DEFENDO AIR/WATER/SUCTION AND BIOPSY VALVE